# Patient Record
Sex: FEMALE | Race: BLACK OR AFRICAN AMERICAN | NOT HISPANIC OR LATINO | Employment: FULL TIME | URBAN - METROPOLITAN AREA
[De-identification: names, ages, dates, MRNs, and addresses within clinical notes are randomized per-mention and may not be internally consistent; named-entity substitution may affect disease eponyms.]

---

## 2017-02-08 ENCOUNTER — OFFICE VISIT (OUTPATIENT)
Dept: OBSTETRICS AND GYNECOLOGY | Facility: CLINIC | Age: 37
End: 2017-02-08
Payer: COMMERCIAL

## 2017-02-08 VITALS
SYSTOLIC BLOOD PRESSURE: 110 MMHG | HEIGHT: 66 IN | DIASTOLIC BLOOD PRESSURE: 88 MMHG | WEIGHT: 209.88 LBS | BODY MASS INDEX: 33.73 KG/M2

## 2017-02-08 DIAGNOSIS — Z34.90 GRAVIDA 7, CURRENTLY PREGNANT: ICD-10-CM

## 2017-02-08 DIAGNOSIS — Z3A.01 4 WEEKS GESTATION OF PREGNANCY: Primary | ICD-10-CM

## 2017-02-08 DIAGNOSIS — N92.6 MISSED PERIOD: ICD-10-CM

## 2017-02-08 DIAGNOSIS — O09.521 AMA (ADVANCED MATERNAL AGE) MULTIGRAVIDA 35+, FIRST TRIMESTER: ICD-10-CM

## 2017-02-08 DIAGNOSIS — Z34.90 UNPLANNED WANTED PREGNANCY: ICD-10-CM

## 2017-02-08 LAB
B-HCG UR QL: POSITIVE
CTP QC/QA: YES

## 2017-02-08 PROCEDURE — 99999 PR PBB SHADOW E&M-EST. PATIENT-LVL III: CPT | Mod: PBBFAC,,, | Performed by: NURSE PRACTITIONER

## 2017-02-08 PROCEDURE — 99214 OFFICE O/P EST MOD 30 MIN: CPT | Mod: S$GLB,,, | Performed by: NURSE PRACTITIONER

## 2017-02-08 NOTE — PROGRESS NOTES
Chief Complaint: Missed Period    HPI: Vika Donis is a 36 y.o., , reporting absence of menses with  Patient's last menstrual period was 2017. Patient and partner were not attempting pregnancy but are okay with pregnancy. She currently denies any vaginal bleeding, leaking fluids, abnormal vaginal discharge,  or GI complaints. No fetal movement detected at this time. She is currently taking a daily prenatal vitamin and no other changes in medications reported at this time. Pt with previous PTD 2/2 Pre-Eclampsia. Currently with CHTN and was taking HCTZ but not regularly and has stopped taking this week. No chronic medical problems for FOB, denies ACOG recommended genetic screening history for patient or FOB    Infection History:  Denies TB exposure, STD history (+HPV), rash since LMP, h/o HSV for pt or partner  Vaccine History:  Last Flu vaccine 10. Last Tdap UTD Childhood Vaccines were UTD    Past Medical History   Diagnosis Date    Abnormal Pap smear of cervix       HPV+    HPV (human papilloma virus) infection     Hypertension      Past Surgical History   Procedure Laterality Date     section       x2     Social History   Substance Use Topics    Smoking status: Never Smoker    Smokeless tobacco: None    Alcohol use Yes      Comment: once a week      Family History   Problem Relation Age of Onset    Diabetes Paternal Grandmother     Hypertension Mother     Breast cancer Maternal Aunt     Colon cancer Neg Hx     Ovarian cancer Neg Hx      OB History    Para Term  AB SAB TAB Ectopic Multiple Living   7 2  2 4 2 2   2      # Outcome Date GA Lbr Drake/2nd Weight Sex Delivery Anes PTL Lv   7 Current            6  13 36w0d  2.977 kg (6 lb 9 oz) M CS-Unspec   Y      Complications: Preeclampsia   5 TAB         N   4  07 36w0d  2.126 kg (4 lb 11 oz) F CS-Unspec   Y      Complications: Preeclampsia   3 SAB         N   2 SAB         " N   1 TAB 2003        N        Visit Vitals    /88 (BP Location: Left arm)    Ht 5' 6" (1.676 m)    Wt 95.2 kg (209 lb 14.1 oz)    LMP 01/11/2017    BMI 33.88 kg/m2     ROS   Systemic: Not feeling tired (fatigue).  No fever chills   Gastrointestinal: No nausea, vomiting, no abdominal pain.  No diarrhea.  Genitourinary: No dysuria. No Pelvic Pain  Skin: No rash.      Physical Exam:   Vital Signs:° Normal.  General Appearance:° Well developed.  ° Well nourished.  Neurological:° No disorientation was observed.  Psychiatric: Affect: ° Normal.    Assessment/Plan  1. Confirmation of pregnancy--UPT in office positive, with pts LMP patient is approximately  4wks 0 days gestation with ROBERT 10.18.17. Ordered dating Ultrasound and apt with OBMD. Start/Continue daily prenatal vitamin. Precautions given and s/s to report back to the office discussed with patient. First T ACOG education discussed today and handout provided. Zika precautions discussed. Discussed genetics with AMA at time of delivery    2. CHTN--stop HCTZ, take home BP readings, discussed readings to report back to office. Will send message to OBMD about medications and monitoring.     RTC prn as schedule with OBMD     ~25 minutes spent with pt Face to Face with >50% of visit spent on education/counseling  "

## 2017-02-08 NOTE — PATIENT INSTRUCTIONS
Topic  General Pregnancy Information Recommended   (Unless Otherwise Contraindicated Or Restrictions Given To You By Your OB Doctor)      1. Anticipated course of prenatal care       Visits: will be Every 4 wks until 28 weeks, then every 2 weeks until 36 weeks, and then weekly until delivery.    Urine will be collected at each Obstetric visit        2. Nutrition and weight gain     Daily pre-sammy vitamin (recommend taking at night)    Additional 300 calories needed daily   No Sushi, hotdogs, unpasteurized products (milk/cheeses). No large fish such as: shark, gregg mackerel, tile, sword fish    Incorporate 12 ounces of smaller seafood/week and no more than 6oz of albacore tuna    Caffeine: 200 mg/day or 2 cups of caffeine/day    Weight gain recommendations are based off of BMI before pregnancy. Generally patients who with normal weight prior pregnancy it is recommended 25-35 pounds of weight gain during the pregnancy with an estimated weekly gain of 1 pound/wk in 2nd and 3rd Trimester.    3. Toxoplasmosis precautions   If cats are in the home avoid changing litter boxes and if you need to change the litter box recommended you use gloves   4. Sexual Activity   Sexual activity is okay unless you are put on restrictions by your provider. I recommend urinating after intercourse    5. Exercise   Generally pre-pregnancy routine is okay to continue    Drink plenty fluids for hydration    Stop any activity that causes heavy cramping like a period or bright red bleeding and contact your provider   No extreme or contact sports    No exercise on your back for an extended period of time after 20 weeks    6. Hot Tub/Saunas   Avoid hot tubes and saunas    7. Hair Treatments   Because of the lack of scientific studies on the effects of chemical treatments on your hair, we must advise that you do it at your own risk. If you choose to treat your hair, we recommend that you wait until after 12 weeks gestation. At  this time there is no reason to believe that normal hair treatment is associated with onsequences to the baby.    8. Vaccines   Influenza vaccine is recommended by CDC during flu season    Tdap (pertussis or whopping cough) recommended each pregnancy between 27 and 36 weeks    Tdap booster recommended for family and other planned direct caregivers    9. Water   Water is an important nutrient in a good diet. However, it cannot be stressed enough that during pregnancy water is essential. The body has increased circulation through blood vessels, and without a large increase in water, pregnant women will be dehydrated. It plays an important role in decreasing constipation, preventing  contractions, decreasing swelling, and preventing dizziness. We recommend that you drink 8-10 glasses of water per day.    10. Smoking/Alcohol/Illicit Drug Use   No safe Level    Can lead to problems with pregnancy    Growth of the developing fetus     labor (delivery before 37 weeks)     rupture of the membranes (water breaking before 37 weeks)    Premature separation of the placenta (which may cause bleeding)    American College of Obstetricians and Gynecologists endorses abstinence    Can lead to babies with disabilities    11. Environmental or work hazards   Unless otherwise restricted you may continue work throughout the pregnancy    Notify your provider of any work hazards or chemical exposure concerns   12. Travel     Safe to travel up to 35 weeks    Continue to wear a seatbelt and airbags are still recommended    Drink plenty fluids    Blood clots are a concern during pregnancy with long travel. Recommend compression stockings and moving around at least every 2 hours and staying hydrated.    13. Use of medications, vitamins, herbs, OTC drugs     Any medications not on the list provided to you from our clinic or given to you by one of our providers we recommend calling to make sure the  medication is safe for you and baby.    14. Domestic Violence     Please notify office immediately of any concerns or violence so that we can help direct you to assistance needed    [place] [employer]: [phone]    15. Childbirth classes     List of Childbirth classes from [facility] is available    16. Selecting a Pediatrician   Selecting a pediatrician before delivery is recommended   You can interview pediatricians before delivery    17. Fetal Monitoring     A simple test of your babys well-being is a kick count. After 26 weeks, fetal motion of any kind should be monitored. Further discussion at that time   18.  Labor Signs     Water break, leaking fluids from Vagina prior 37 weeks   Regular contractions, Contractions that are more than 5-6/hour, getting stronger and painful with lower back pain, does not go away with rest and fluids    19. Postpartum Family Planning     Multiple options available from short term methods to long term reversible and irreversible methods    Discuss with provider as you get closer to delivery    20. Dental     It is recommended that you get an annual dental cleaning    21. Breastfeeding     Classes offered at [facility] and it is recommended to take a class    22. Lifting  In 2013, the National [place] for Occupational Safety and Health (NIOSH) published clinical guidelines for occupational lifting in uncomplicated pregnancies. The recommended weight limits are based on gestational age, intermittent versus repetitive lifting, time (hours/day) spent lifting, and lifting height from floor and distance in 3 front of body. In this guideline, the maximum permissible weight for a woman less than 20 weeks of gestation performing infrequent lifting is 36 pounds (16 kgs) and the maximum permissible weight at ?20 weeks is 26 pounds (12 kgs). For repetitive lifting ?1 hour/day, the maximum weights in the first and second half of pregnancy are  18 pounds (8 kgs) and 13 pounds (6 kgs), respectively, and for repetitive lifting <1 hour/day, the maximum weights are 30 pounds (14 kgs) and 22 pounds (10 kgs), respectively. Although not based on high quality evidence, these guidelines are a reasonable reference for counseling pregnant women     23. Scheduling and Provider Availability     Your Obstetric Doctor is usually here weekly but not every day. We recommend you make 3-4 advanced appointments at a time to accommodate your personal needs and work/school obligations.    We ask that you come 15 minutes prior your scheduled appointment.    For same day appointments (not routine appointments) there is a Nurse Practitioner or another obstetric provider available. Please let the  aware you are an OB patient requesting a same day appointment.      24. Recommended Phone Tegan     [facility]    [facility]

## 2017-02-08 NOTE — MR AVS SNAPSHOT
[facility]  [address]  [place] [address]  Phone: [phone]  Fax: [phone]                  Vika Donis   2017 9:20 AM   Office Visit    Description:  Female : 1980   Provider:  Mei Knapp NP   Department:  [facility]           Reason for Visit     Absent Menses           Diagnoses this Visit        Comments    Missed period    -  Primary            To Do List           Future Appointments        Provider Department Dept Phone    3/7/2017 3:00 PM [facility], WOMEN'S ULTRASOUND [facility] [phone]    3/7/2017 3:45 PM Asher Gannon MD [facility] [phone]    4/10/2017 9:45 AM Asher Gannon MD [facility] [phone]    2017 9:45 AM Asher Gannon MD [facility] [phone]      Goals (5 Years of Data)     None      [facility] On [facility]     [facility] On Call Nurse [facility] -  Assistance  Registered nurses in the [facility] On Call Center provide clinical advisement, health education, appointment booking, and other advisory services.  Call for this free service at [phone].             Medications           Message regarding Medications     Verify the changes and/or additions to your medication regime listed below are the same as discussed with your clinician today.  If any of these changes or additions are incorrect, please notify your healthcare provider.             Verify that the below list of medications is an accurate representation of the medications you are currently taking.  If none reported, the list may be blank. If incorrect, please contact your healthcare provider. Carry this list with you in case of emergency.           Current Medications     hydrochlorothiazide (MICROZIDE) 12.5 mg capsule Take 12.5 mg by mouth once daily.    L.acidophilus-Bif. animalis (PROBIOTIC) 5 billion cell CpSP Take by mouth.    norethindrone-e.estradiol-iron (GILDESS 24 FE) 1 mg-20 mcg (24)/75  "mg (4) Oral per tablet Take 1 tablet by mouth once daily.           Clinical Reference Information           Your Vitals Were     BP Height Weight Last Period BMI    110/92 5' 6" (1.676 m) 95.2 kg (209 lb 14.1 oz) 01/11/2017 33.88 kg/m2      Blood Pressure          Most Recent Value    BP  (!)  110/92      Allergies as of 2/8/2017     No Known Allergies      Immunizations Administered on Date of Encounter - 2/8/2017     None      Orders Placed During Today's Visit      Normal Orders This Visit    POCT urine pregnancy     Future Labs/Procedures Expected by Expires    US OB/GYN Procedure ([facility]) - Extended List  As directed 2/8/2018 2/8/2017  8:45 AM - Elli Kovacs MA      Component Results     Component Value Flag Ref Range Units Status    POC Preg Test, Ur Positive (A) Negative  Final     Acceptable Yes    Final            [facility] Sign-Up     Activating your [facility] account is as easy as 1-2-3!     1) Visit [email or web address], select Sign Up Now, enter this activation code and your date of birth, then select Next.  [email or web address]  Expires: 3/25/2017  9:17 AM      2) Create a username and password to use when you visit [facility] in the future and select a security question in case you lose your password and select Next.    3) Enter your e-mail address and click Sign Up!    Additional Information  If you have questions, please e-mail [email or web address] or call [phone] to talk to our [facility] staff. Remember, [facility] is NOT to be used for urgent needs. For medical emergencies, dial 911.         Instructions    Topic  General Pregnancy Information Recommended   (Unless Otherwise Contraindicated Or Restrictions Given To You By Your OB Doctor)      1. Anticipated course of prenatal care       Visits: will be Every 4 wks until 28 weeks, then every 2 weeks until 36 weeks, and then weekly until delivery.    Urine will be collected at each Obstetric visit        2. Nutrition and " weight gain     Daily pre- vitamin (recommend taking at night)    Additional 300 calories needed daily   No Sushi, hotdogs, unpasteurized products (milk/cheeses). No large fish such as: shark, gregg mackerel, tile, sword fish    Incorporate 12 ounces of smaller seafood/week and no more than 6oz of albacore tuna    Caffeine: 200 mg/day or 2 cups of caffeine/day    Weight gain recommendations are based off of BMI before pregnancy. Generally patients who with normal weight prior pregnancy it is recommended 25-35 pounds of weight gain during the pregnancy with an estimated weekly gain of 1 pound/wk in 2nd and 3rd Trimester.    3. Toxoplasmosis precautions   If cats are in the home avoid changing litter boxes and if you need to change the litter box recommended you use gloves   4. Sexual Activity   Sexual activity is okay unless you are put on restrictions by your provider. I recommend urinating after intercourse    5. Exercise   Generally pre-pregnancy routine is okay to continue    Drink plenty fluids for hydration    Stop any activity that causes heavy cramping like a period or bright red bleeding and contact your provider   No extreme or contact sports    No exercise on your back for an extended period of time after 20 weeks    6. Hot Tub/Saunas   Avoid hot tubes and saunas    7. Hair Treatments   Because of the lack of scientific studies on the effects of chemical treatments on your hair, we must advise that you do it at your own risk. If you choose to treat your hair, we recommend that you wait until after 12 weeks gestation. At this time there is no reason to believe that normal hair treatment is associated with onsequences to the baby.    8. Vaccines   Influenza vaccine is recommended by CDC during flu season    Tdap (pertussis or whopping cough) recommended each pregnancy between 27 and 36 weeks    Tdap booster recommended for family and other planned direct caregivers    9. Water   Water  is an important nutrient in a good diet. However, it cannot be stressed enough that during pregnancy water is essential. The body has increased circulation through blood vessels, and without a large increase in water, pregnant women will be dehydrated. It plays an important role in decreasing constipation, preventing  contractions, decreasing swelling, and preventing dizziness. We recommend that you drink 8-10 glasses of water per day.    10. Smoking/Alcohol/Illicit Drug Use   No safe Level    Can lead to problems with pregnancy    Growth of the developing fetus     labor (delivery before 37 weeks)     rupture of the membranes (water breaking before 37 weeks)    Premature separation of the placenta (which may cause bleeding)    American College of Obstetricians and Gynecologists endorses abstinence    Can lead to babies with disabilities    11. Environmental or work hazards   Unless otherwise restricted you may continue work throughout the pregnancy    Notify your provider of any work hazards or chemical exposure concerns   12. Travel     Safe to travel up to 35 weeks    Continue to wear a seatbelt and airbags are still recommended    Drink plenty fluids    Blood clots are a concern during pregnancy with long travel. Recommend compression stockings and moving around at least every 2 hours and staying hydrated.    13. Use of medications, vitamins, herbs, OTC drugs     Any medications not on the list provided to you from our clinic or given to you by one of our providers we recommend calling to make sure the medication is safe for you and baby.    14. Domestic Violence     Please notify office immediately of any concerns or violence so that we can help direct you to assistance needed    [place] [employer] Domestic [employer]: [phone]    15. Childbirth classes     List of Childbirth classes from [facility] is available    16. Selecting a Pediatrician   Selecting a  pediatrician before delivery is recommended   You can interview pediatricians before delivery    17. Fetal Monitoring     A simple test of your babys well-being is a kick count. After 26 weeks, fetal motion of any kind should be monitored. Further discussion at that time   18.  Labor Signs     Water break, leaking fluids from Vagina prior 37 weeks   Regular contractions, Contractions that are more than 5-6/hour, getting stronger and painful with lower back pain, does not go away with rest and fluids    19. Postpartum Family Planning     Multiple options available from short term methods to long term reversible and irreversible methods    Discuss with provider as you get closer to delivery    20. Dental     It is recommended that you get an annual dental cleaning    21. Breastfeeding     Classes offered at [facility] and it is recommended to take a class    22. Lifting  In 2013, the National [place] for Occupational Safety and Health (NIOSH) published clinical guidelines for occupational lifting in uncomplicated pregnancies. The recommended weight limits are based on gestational age, intermittent versus repetitive lifting, time (hours/day) spent lifting, and lifting height from floor and distance in 3 front of body. In this guideline, the maximum permissible weight for a woman less than 20 weeks of gestation performing infrequent lifting is 36 pounds (16 kgs) and the maximum permissible weight at ?20 weeks is 26 pounds (12 kgs). For repetitive lifting ?1 hour/day, the maximum weights in the first and second half of pregnancy are 18 pounds (8 kgs) and 13 pounds (6 kgs), respectively, and for repetitive lifting <1 hour/day, the maximum weights are 30 pounds (14 kgs) and 22 pounds (10 kgs), respectively. Although not based on high quality evidence, these guidelines are a reasonable reference for counseling pregnant women     23. Scheduling and Provider Availability     Your Obstetric Doctor is usually  here weekly but not every day. We recommend you make 3-4 advanced appointments at a time to accommodate your personal needs and work/school obligations.    We ask that you come 15 minutes prior your scheduled appointment.    For same day appointments (not routine appointments) there is a Nurse Practitioner or another obstetric provider available. Please let the  aware you are an OB patient requesting a same day appointment.      24. Recommended Phone Tegan     [facility]               Language Assistance Services     ATTENTION: Language assistance services are available, free of charge. Please call [phone].      ATENCIÓN: Si habla español, tiene a bradshaw disposición servicios gratuitos de asistencia lingüística. Llame al [phone].     OSWALDO Ý: N?u b?n nói Ti?ng Vi?t, có các d?ch v? h? tr? ngôn ng? mi?n phí dành cho b?n. G?i s? [phone].         [facility] complies with applicable Federal civil rights laws and does not discriminate on the basis of race, color, national origin, age, disability, or sex.

## 2017-03-07 ENCOUNTER — PROCEDURE VISIT (OUTPATIENT)
Dept: OBSTETRICS AND GYNECOLOGY | Facility: CLINIC | Age: 37
End: 2017-03-07
Payer: COMMERCIAL

## 2017-03-07 ENCOUNTER — INITIAL PRENATAL (OUTPATIENT)
Dept: OBSTETRICS AND GYNECOLOGY | Facility: CLINIC | Age: 37
End: 2017-03-07
Attending: OBSTETRICS & GYNECOLOGY
Payer: COMMERCIAL

## 2017-03-07 VITALS
WEIGHT: 213.31 LBS | BODY MASS INDEX: 34.43 KG/M2 | DIASTOLIC BLOOD PRESSURE: 76 MMHG | SYSTOLIC BLOOD PRESSURE: 122 MMHG

## 2017-03-07 DIAGNOSIS — Z34.91 ENCOUNTER FOR SUPERVISION OF NORMAL PREGNANCY IN FIRST TRIMESTER, UNSPECIFIED GRAVIDITY: ICD-10-CM

## 2017-03-07 DIAGNOSIS — N92.6 MISSED PERIOD: ICD-10-CM

## 2017-03-07 DIAGNOSIS — Z34.81 SUPERVISION OF NORMAL INTRAUTERINE PREGNANCY IN MULTIGRAVIDA, FIRST TRIMESTER: Primary | ICD-10-CM

## 2017-03-07 PROCEDURE — 99999 PR PBB SHADOW E&M-EST. PATIENT-LVL II: CPT | Mod: PBBFAC,,, | Performed by: OBSTETRICS & GYNECOLOGY

## 2017-03-07 PROCEDURE — 76801 OB US < 14 WKS SINGLE FETUS: CPT | Mod: 26,S$GLB,, | Performed by: OBSTETRICS & GYNECOLOGY

## 2017-03-07 PROCEDURE — 0500F INITIAL PRENATAL CARE VISIT: CPT | Mod: S$GLB,,, | Performed by: OBSTETRICS & GYNECOLOGY

## 2017-03-07 PROCEDURE — 87086 URINE CULTURE/COLONY COUNT: CPT

## 2017-03-07 RX ORDER — HYDROCHLOROTHIAZIDE 12.5 MG/1
12.5 TABLET ORAL DAILY
Refills: 0 | COMMUNITY
Start: 2017-02-02 | End: 2017-03-07

## 2017-03-09 LAB — BACTERIA UR CULT: NORMAL

## 2017-03-13 NOTE — PROGRESS NOTES
46-year-old  presents with a positive pregnancy test here for ultrasound and dating today.  Ultrasound consistent with LMP providing a due date of 10/18/17.  Patient without complaints.  Feeling and doing well.  Some waves of nausea but has no emesis.  We discussed B6 and Unisom.

## 2017-03-14 ENCOUNTER — TELEPHONE (OUTPATIENT)
Dept: OBSTETRICS AND GYNECOLOGY | Facility: CLINIC | Age: 37
End: 2017-03-14

## 2017-03-14 NOTE — TELEPHONE ENCOUNTER
[facility] called to confirm date of pts next appt because they could not reach pt. I called pt and confirmed with her that she was ok with us giving that information. She signed a release last week so that they could become her secondary insurance provider. Information given to [facility].

## 2017-04-10 ENCOUNTER — ROUTINE PRENATAL (OUTPATIENT)
Dept: OBSTETRICS AND GYNECOLOGY | Facility: CLINIC | Age: 37
End: 2017-04-10
Payer: COMMERCIAL

## 2017-04-10 ENCOUNTER — LAB VISIT (OUTPATIENT)
Dept: LAB | Facility: HOSPITAL | Age: 37
End: 2017-04-10
Attending: OBSTETRICS & GYNECOLOGY
Payer: COMMERCIAL

## 2017-04-10 VITALS
BODY MASS INDEX: 34.11 KG/M2 | DIASTOLIC BLOOD PRESSURE: 78 MMHG | WEIGHT: 211.31 LBS | SYSTOLIC BLOOD PRESSURE: 122 MMHG

## 2017-04-10 DIAGNOSIS — Z34.81 SUPERVISION OF NORMAL INTRAUTERINE PREGNANCY IN MULTIGRAVIDA, FIRST TRIMESTER: ICD-10-CM

## 2017-04-10 DIAGNOSIS — Z3A.12 12 WEEKS GESTATION OF PREGNANCY: Primary | ICD-10-CM

## 2017-04-10 LAB
ABO + RH BLD: NORMAL
BASOPHILS # BLD AUTO: 0.01 K/UL
BASOPHILS NFR BLD: 0.3 %
BLD GP AB SCN CELLS X3 SERPL QL: NORMAL
DIFFERENTIAL METHOD: ABNORMAL
EOSINOPHIL # BLD AUTO: 0 K/UL
EOSINOPHIL NFR BLD: 0.5 %
ERYTHROCYTE [DISTWIDTH] IN BLOOD BY AUTOMATED COUNT: 12.2 %
GLUCOSE SERPL-MCNC: 91 MG/DL
HCT VFR BLD AUTO: 35.4 %
HGB BLD-MCNC: 12.2 G/DL
LYMPHOCYTES # BLD AUTO: 1 K/UL
LYMPHOCYTES NFR BLD: 25.5 %
MCH RBC QN AUTO: 29.5 PG
MCHC RBC AUTO-ENTMCNC: 34.5 %
MCV RBC AUTO: 86 FL
MONOCYTES # BLD AUTO: 0.3 K/UL
MONOCYTES NFR BLD: 7.1 %
NEUTROPHILS # BLD AUTO: 2.6 K/UL
NEUTROPHILS NFR BLD: 66.3 %
PLATELET # BLD AUTO: 239 K/UL
PMV BLD AUTO: 10.8 FL
RBC # BLD AUTO: 4.13 M/UL
TSH SERPL DL<=0.005 MIU/L-ACNC: 1.16 UIU/ML
WBC # BLD AUTO: 3.96 K/UL

## 2017-04-10 PROCEDURE — 99999 PR PBB SHADOW E&M-EST. PATIENT-LVL II: CPT | Mod: PBBFAC,,, | Performed by: OBSTETRICS & GYNECOLOGY

## 2017-04-10 PROCEDURE — 85025 COMPLETE CBC W/AUTO DIFF WBC: CPT

## 2017-04-10 PROCEDURE — 86762 RUBELLA ANTIBODY: CPT

## 2017-04-10 PROCEDURE — 0502F SUBSEQUENT PRENATAL CARE: CPT | Mod: S$GLB,,, | Performed by: OBSTETRICS & GYNECOLOGY

## 2017-04-10 PROCEDURE — 86592 SYPHILIS TEST NON-TREP QUAL: CPT

## 2017-04-10 PROCEDURE — 86850 RBC ANTIBODY SCREEN: CPT

## 2017-04-10 PROCEDURE — 86703 HIV-1/HIV-2 1 RESULT ANTBDY: CPT

## 2017-04-10 PROCEDURE — 86900 BLOOD TYPING SEROLOGIC ABO: CPT

## 2017-04-10 PROCEDURE — 84443 ASSAY THYROID STIM HORMONE: CPT

## 2017-04-10 PROCEDURE — 87340 HEPATITIS B SURFACE AG IA: CPT

## 2017-04-10 PROCEDURE — 82947 ASSAY GLUCOSE BLOOD QUANT: CPT

## 2017-04-10 NOTE — MR AVS SNAPSHOT
[facility]  [address]  [place] [address]  Phone: [phone]  Fax: [phone]                  Vika Donis   4/10/2017 9:45 AM   Routine Prenatal    Description:  Female : 1980   Provider:  Asher Gannon MD   Department:  [facility]           Reason for Visit     Routine Prenatal Visit           Diagnoses this Visit        Comments    12 weeks gestation of pregnancy    -  Primary            To Do List           Future Appointments        Provider Department Dept Phone    2017 9:45 AM Asher Gannon MD [facility] [phone]    2017 10:15 AM ADDITIONS, SPECIAL LWSC [facility] [phone]    2017 9:40 AM Debbie Goodson NP [facility] [phone]    7/3/2017 9:15 AM Asher Gannon MD [facility] [phone]      Goals (5 Years of Data)     None      Follow-Up and Disposition     Return in about 4 weeks (around 2017).      [facility]     [facility] On Call Nurse Care Line -  Assistance  Unless otherwise directed by your provider, please contact [facility], our nurse care line that is available for  assistance.     Registered nurses in the [facility] On Call Center provide: appointment scheduling, clinical advisement, health education, and other advisory services.  Call: [phone] (toll free)               Medications           Message regarding Medications     Verify the changes and/or additions to your medication regime listed below are the same as discussed with your clinician today.  If any of these changes or additions are incorrect, please notify your healthcare provider.             Verify that the below list of medications is an accurate representation of the medications you are currently taking.  If none reported, the list may be blank. If incorrect, please contact your healthcare provider. Carry this list with you in case of emergency.           Current  Medications     multivitamin with minerals tablet Take 1 tablet by mouth once daily.           Clinical Reference Information           Prenatal Vitals     Enc. Date GA Prenatal Vitals Prenatal Pulse Pain Level Urine Albumin/Glucose Edema Presentation Dilation/Effacement/Station    4/10/17 12w5d 122/78 / 95.8 kg (211 lb 5 oz) 12 cm / 155 / Absent  0 Negative / Negative None / None / None / No      3/7/17 7w6d 122/76 / 96.7 kg (213 lb 4.7 oz) 8 cm / present / Absent  0 Negative / Negative None / None / None / No        Your Vitals Were     BP Weight Last Period BMI       122/78 95.8 kg (211 lb 5 oz) 01/11/2017 34.11 kg/m2       Allergies as of 4/10/2017     No Known Allergies      Immunizations Administered on Date of Encounter - 4/10/2017     None      Language Assistance Services     ATTENTION: Language assistance services are available, free of charge. Please call [phone].      ATENCIÓN: Si habla español, tiene a bradshaw disposición servicios gratuitos de asistencia lingüística. Llame al [phone].     [facility] Ý: N?u b?n nói Ti?ng Vi?t, có các d?ch v? h? tr? ngôn ng? mi?n phí eneidah cho b?n. G?i s? [phone].         [facility] complies with applicable Federal civil rights laws and does not discriminate on the basis of race, color, national origin, age, disability, or sex.

## 2017-04-10 NOTE — LETTER
April 10, 2017    Vika Donis  [address]  [place] [address]             [facility] [address] [facility] [address]  Phone: [phone]  Fax: [phone]   To Whom it May Concern:    Please cancel or postpone Vika Donis membership as she is currently 12+wks pregnant. If you have any questions please call us.    Thanks,    Dr.Robin Gannon

## 2017-04-10 NOTE — PROGRESS NOTES
Patient without complaints except for mild nausea.  Has been doing Unisom at night but has not taken B6 yet.  We discussed I cleavages but patient declined as it's not that bad.  Will put in order for MFM ultrasound at 20 weeks.  Will draw prenatal labs today.

## 2017-04-11 LAB
HBV SURFACE AG SERPL QL IA: NEGATIVE
HIV 1+2 AB+HIV1 P24 AG SERPL QL IA: NEGATIVE
RPR SER QL: NORMAL
RUBV IGG SER-ACNC: 44 IU/ML
RUBV IGG SER-IMP: REACTIVE

## 2017-05-08 ENCOUNTER — ROUTINE PRENATAL (OUTPATIENT)
Dept: OBSTETRICS AND GYNECOLOGY | Facility: CLINIC | Age: 37
End: 2017-05-08
Payer: COMMERCIAL

## 2017-05-08 VITALS
WEIGHT: 214.75 LBS | DIASTOLIC BLOOD PRESSURE: 78 MMHG | BODY MASS INDEX: 34.66 KG/M2 | SYSTOLIC BLOOD PRESSURE: 122 MMHG

## 2017-05-08 DIAGNOSIS — Z3A.16 16 WEEKS GESTATION OF PREGNANCY: Primary | ICD-10-CM

## 2017-05-08 PROCEDURE — 0502F SUBSEQUENT PRENATAL CARE: CPT | Mod: S$GLB,,, | Performed by: OBSTETRICS & GYNECOLOGY

## 2017-05-08 PROCEDURE — 99999 PR PBB SHADOW E&M-EST. PATIENT-LVL II: CPT | Mod: PBBFAC,,, | Performed by: OBSTETRICS & GYNECOLOGY

## 2017-05-08 NOTE — PROGRESS NOTES
Pt without c/o No nausea now Desires mat 21. She will call for insurance coverage.  MFM u/s at 20 weeks

## 2017-05-08 NOTE — MR AVS SNAPSHOT
[facility]  [address]  [place] [address]  Phone: [phone]  Fax: [phone]                  Vika Donis   2017 9:45 AM   Routine Prenatal    Description:  Female : 1980   Provider:  Asher Gannon MD   Department:  [facility]           Reason for Visit     Routine Prenatal Visit           Diagnoses this Visit        Comments    16 weeks gestation of pregnancy    -  Primary            To Do List           Future Appointments        Provider Department Dept Phone    2017 9:40 AM Debbie Goodson NP [facility] [phone]    7/3/2017 9:15 AM Asher Gannon MD [facility] [phone]      Goals (5 Years of Data)     None      Follow-Up and Disposition     Return in about 4 weeks (around 2017).    Follow-up and Disposition History      [facility]     [facility] On Call Nurse Care Line -  Assistance  Unless otherwise directed by your provider, please contact [facility], our nurse care line that is available for  assistance.     Registered nurses in the [facility] On Call Center provide: appointment scheduling, clinical advisement, health education, and other advisory services.  Call: [phone] (toll free)               Medications           Message regarding Medications     Verify the changes and/or additions to your medication regime listed below are the same as discussed with your clinician today.  If any of these changes or additions are incorrect, please notify your healthcare provider.             Verify that the below list of medications is an accurate representation of the medications you are currently taking.  If none reported, the list may be blank. If incorrect, please contact your healthcare provider. Carry this list with you in case of emergency.           Current Medications     multivitamin with minerals tablet Take 1 tablet by mouth once daily.           Clinical Reference  Information           Prenatal Vitals     Enc. Date GA Prenatal Vitals Prenatal Pulse Pain Level Urine Albumin/Glucose Edema Presentation Dilation/Effacement/Station    5/8/17 16w5d 122/78 / 97.4 kg (214 lb 11.7 oz) 16 cm / 144 / Present  0 Negative / Negative None / None / None / No      4/10/17 12w5d 122/78 / 95.8 kg (211 lb 5 oz) 12 cm / 155 / Absent  0 Negative / Negative None / None / None / No      3/7/17 7w6d 122/76 / 96.7 kg (213 lb 4.7 oz) 8 cm / present / Absent  0 Negative / Negative None / None / None / No        Your Vitals Were     BP Weight Last Period BMI       122/78 97.4 kg (214 lb 11.7 oz) 01/11/2017 34.66 kg/m2       Allergies as of 5/8/2017     No Known Allergies      Immunizations Administered on Date of Encounter - 5/8/2017     None      Orders Placed During Today's Visit     Future Labs/Procedures Expected by Expires    [facility] Procedure (Viewpoint)  As directed 5/8/2018      Language Assistance Services     ATTENTION: Language assistance services are available, free of charge. Please call [phone].      ATENCIÓN: Si habla español, tiene a bradshaw disposición servicios gratuitos de asistencia lingüística. Llame al [phone].     [facility] Ý: N?u b?n nói Ti?ng Vi?t, có các d?ch v? h? tr? ngôn ng? mi?n phí dành cho b?n. G?i s? [phone].         [facility] complies with applicable Federal civil rights laws and does not discriminate on the basis of race, color, national origin, age, disability, or sex.

## 2017-06-05 ENCOUNTER — OFFICE VISIT (OUTPATIENT)
Dept: MATERNAL FETAL MEDICINE | Facility: CLINIC | Age: 37
End: 2017-06-05
Attending: OBSTETRICS & GYNECOLOGY
Payer: COMMERCIAL

## 2017-06-05 ENCOUNTER — ROUTINE PRENATAL (OUTPATIENT)
Dept: OBSTETRICS AND GYNECOLOGY | Facility: CLINIC | Age: 37
End: 2017-06-05
Payer: COMMERCIAL

## 2017-06-05 VITALS
BODY MASS INDEX: 35.28 KG/M2 | SYSTOLIC BLOOD PRESSURE: 116 MMHG | DIASTOLIC BLOOD PRESSURE: 72 MMHG | WEIGHT: 218.56 LBS

## 2017-06-05 DIAGNOSIS — Z3A.16 16 WEEKS GESTATION OF PREGNANCY: ICD-10-CM

## 2017-06-05 DIAGNOSIS — Z34.82 ENCOUNTER FOR SUPERVISION OF OTHER NORMAL PREGNANCY, SECOND TRIMESTER: ICD-10-CM

## 2017-06-05 DIAGNOSIS — O09.529 AMA (ADVANCED MATERNAL AGE) MULTIGRAVIDA 35+, UNSPECIFIED TRIMESTER: Primary | ICD-10-CM

## 2017-06-05 DIAGNOSIS — Z36.89 ENCOUNTER FOR FETAL ANATOMIC SURVEY: ICD-10-CM

## 2017-06-05 DIAGNOSIS — Z36.89 ENCOUNTER FOR ULTRASOUND TO CHECK FETAL GROWTH: ICD-10-CM

## 2017-06-05 DIAGNOSIS — Z3A.20 20 WEEKS GESTATION OF PREGNANCY: Primary | ICD-10-CM

## 2017-06-05 PROCEDURE — 76811 OB US DETAILED SNGL FETUS: CPT | Mod: S$GLB,,, | Performed by: OBSTETRICS & GYNECOLOGY

## 2017-06-05 PROCEDURE — 99499 UNLISTED E&M SERVICE: CPT | Mod: S$GLB,,, | Performed by: OBSTETRICS & GYNECOLOGY

## 2017-06-05 PROCEDURE — 0502F SUBSEQUENT PRENATAL CARE: CPT | Mod: S$GLB,,, | Performed by: NURSE PRACTITIONER

## 2017-06-05 PROCEDURE — 99999 PR PBB SHADOW E&M-EST. PATIENT-LVL II: CPT | Mod: PBBFAC,,, | Performed by: NURSE PRACTITIONER

## 2017-06-05 NOTE — PROGRESS NOTES
Doing well today.  Reports some low back pain during latter half of day when working, but thinks it may be r/t her steel toe boots she has to wear to work.  Note given to allow pt to wear soft profile shoes prn.  Labor/bleeding prec given.  Has MFM U/S today.

## 2017-06-05 NOTE — LETTER
June 5, 2017      Asher Gannon MD  [address]  [address]  [facility] [address]           [facility]  [address]  [facility] [address]  Phone: [phone]          Patient: Vika Donis   MR Number: [id number]   YOB: 1980   Date of Visit: 6/5/2017       Dear Dr. Asher Gannon:    Thank you for referring Vika Donis to me for evaluation. Attached you will find relevant portions of my assessment and plan of care.    If you have questions, please do not hesitate to call me. I look forward to following Vika Donis along with you.    Sincerely,    Jennifer Lozano MD    Enclosure  CC:  No Recipients    If you would like to receive this communication electronically, please contact [email or web address] or [phone] to request more information on [facility] access.    For providers and/or their staff who would like to refer a patient to [facility], please contact us through our one-stop-shop provider referral line, [facility] , at [phone].    If you feel you have received this communication in error or would no longer like to receive these types of communications, please e-mail [email or web address]

## 2017-06-05 NOTE — PROGRESS NOTES
Indication  ========    Fetal anatomy survey, AMA.    History  ======    General History  Other: Dr. SAMRA MCCULLOUGH  Previous Outcomes   3  Para 2  Risk Factors  History risk factors: AMA    Pregnancy History  ==============    Maternal Lab Tests  Genetic screening declined.      Method  ======    Transabdominal ultrasound examination. View: Good view.    Pregnancy  =========    New pregnancy. Number of fetuses: 1.    Dating  ======    Cycle: regular cycle  Ultrasound examination on: 2017  GA by U/S based upon: AC, BPD, Femur, HC  GA by U/S 21 w + 3 d  ROBERT by U/S: 10/13/2017  Assigned: Dating performed on 2017, based on the LMP  Assigned GA 20 w + 5 d  Assigned ROBERT: 10/18/2017    General Evaluation  ==============    Cardiac activity: present.  bpm.  Fetal movements: visualized.  Presentation: cephalic.  Placenta:  Placental site: anterior, anterior, right, anterior, fundal.  Umbilical cord: 3 vessel cord, normal.  Amniotic fluid: normal amount.    Fetal Biometry  ============    Fetal Biometry  BPD 47.6 mm 20w 3d Hadlock  OFD 65.4 mm 22w 1d Madeline  .8 mm 20w 5d Hadlock  .2 mm 22w 0d Hadlock  Femur 39.0 mm 22w 4d Hadlock  Cerebellum tr 20.9 mm 20w 4d Steven  CM 4.8 mm  Nuchal fold 4.35 mm  Humerus 36.6 mm 22w 5d Madeline   g 56% Mj  Calculated by: Hadlock (BPD-HC-AC-FL)  EFW (lb) 1 lb  EFW (oz) 1 oz  Cephalic index 0.73  HC / AC 1.08  FL / BPD 0.82  FL / AC 0.23   bpm  Head / Face / Neck   5.7 mm  Nasal bone 5.9 mm    Fetal Anatomy  ============    Cranium: normal  Lateral ventricles: normal  Choroid plexus: normal  Midline falx: normal  Cavum septi pellucidi: normal  Cerebellum: normal  Cisterna magna: normal  Head shape: normal  Rt lateral ventricle: normal  Lt lateral ventricle: normal  Rt choroid plexus: normal  Lt choroid plexus: normal  Parenchyma: normal  Third ventricle: normal  Posterior fossa: normal  Cerebellar  lobes: normal  Vermis: normal  Neck: appears normal  Nuchal fold: normal  Lips: normal  Profile: normal  Nose: normal  Maxilla: normal  Mandible: normal  4-chamber view: normal  RVOT: normal  LVOT: normal  Heart / Thorax: Septal views: normal  Situs: normal  Aortic arch: normal  Ductal arch: normal  SVC: normal  IVC: normal  3-vessel view: normal  3-vessel-trachea view: normal  Cardiac position: normal  Cardiac axis: normal  Cardiac size: normal  Cardiac rhythm: normal  Rt lung: normal  Lt lung: normal  Diaphragm: normal  Cord insertion: normal  Stomach: normal  Kidneys: normal  Bladder: normal  Genitals: normal  Abdom. wall: appears normal  Abdom. cavity: normal  Rt kidney: normal  Lt kidney: normal  Liver: normal  Bowel: normal  Cervical spine: normal  Thoracic spine: normal  Lumbar spine: normal  Sacral spine: normal  Arms: both visualized  Legs: both visualized  Rt arm: normal  Lt arm: normal  Rt hand: visualized  Rt hand: closed  Lt hand: visualized  Lt hand: open  Rt leg: normal  Lt leg: normal  Rt foot: normal  Lt foot: normal  Position of hands: normal  Position of feet: normal  Gender: male  Wants to know gender: yes    Maternal Structures  ===============    Uterus / Cervix  Cervical length 44.1 mm  Ovaries / Tubes / Adnexa  Rt ovary: Visualized  Lt ovary: Visualized  Other: Uterus and adnexa normal    Impression  =========    New live intrauterine pregnancy.  Biometry agrees with the clinical dating.  Normal amniotic fluid volume by qualitative assessment.  Normal fetal anatomy with no obvious abnormalities.  Placenta is anterior, right and fundal without previa.  Cervical length is normal by transabdominal assessment.    Recommendation  ==============    Follow up ultrasound at 32 weeks for growth and fluid assessment due to AMA.

## 2017-06-06 ENCOUNTER — TELEPHONE (OUTPATIENT)
Dept: OBSTETRICS AND GYNECOLOGY | Facility: CLINIC | Age: 37
End: 2017-06-06

## 2017-06-06 NOTE — TELEPHONE ENCOUNTER
Dr Gannon pt calling, Ob pt and she is in the  she needs a note stating her due date and that she is pregnant. Pt said you can send it through the portal but please call her to let her know this is done. Pt # [phone]

## 2017-07-03 ENCOUNTER — ROUTINE PRENATAL (OUTPATIENT)
Dept: OBSTETRICS AND GYNECOLOGY | Facility: CLINIC | Age: 37
End: 2017-07-03
Payer: COMMERCIAL

## 2017-07-03 VITALS
DIASTOLIC BLOOD PRESSURE: 84 MMHG | BODY MASS INDEX: 36.01 KG/M2 | WEIGHT: 223.13 LBS | SYSTOLIC BLOOD PRESSURE: 126 MMHG

## 2017-07-03 DIAGNOSIS — Z34.82 ENCOUNTER FOR SUPERVISION OF OTHER NORMAL PREGNANCY IN SECOND TRIMESTER: Primary | ICD-10-CM

## 2017-07-03 DIAGNOSIS — Z11.3 SCREENING EXAMINATION FOR STD (SEXUALLY TRANSMITTED DISEASE): ICD-10-CM

## 2017-07-03 LAB
C TRACH DNA SPEC QL NAA+PROBE: NOT DETECTED
N GONORRHOEA DNA SPEC QL NAA+PROBE: NOT DETECTED

## 2017-07-03 PROCEDURE — 99999 PR PBB SHADOW E&M-EST. PATIENT-LVL I: CPT | Mod: PBBFAC,,, | Performed by: OBSTETRICS & GYNECOLOGY

## 2017-07-03 PROCEDURE — 87591 N.GONORRHOEAE DNA AMP PROB: CPT

## 2017-07-03 PROCEDURE — 0502F SUBSEQUENT PRENATAL CARE: CPT | Mod: S$GLB,,, | Performed by: OBSTETRICS & GYNECOLOGY

## 2017-07-24 ENCOUNTER — LAB VISIT (OUTPATIENT)
Dept: LAB | Facility: HOSPITAL | Age: 37
End: 2017-07-24
Attending: OBSTETRICS & GYNECOLOGY
Payer: COMMERCIAL

## 2017-07-24 ENCOUNTER — ROUTINE PRENATAL (OUTPATIENT)
Dept: OBSTETRICS AND GYNECOLOGY | Facility: CLINIC | Age: 37
End: 2017-07-24
Payer: COMMERCIAL

## 2017-07-24 VITALS
SYSTOLIC BLOOD PRESSURE: 122 MMHG | DIASTOLIC BLOOD PRESSURE: 80 MMHG | BODY MASS INDEX: 35.97 KG/M2 | WEIGHT: 222.88 LBS

## 2017-07-24 DIAGNOSIS — Z3A.27 27 WEEKS GESTATION OF PREGNANCY: Primary | ICD-10-CM

## 2017-07-24 DIAGNOSIS — Z34.82 ENCOUNTER FOR SUPERVISION OF OTHER NORMAL PREGNANCY IN SECOND TRIMESTER: ICD-10-CM

## 2017-07-24 LAB
BASOPHILS # BLD AUTO: 0.01 K/UL
BASOPHILS NFR BLD: 0.2 %
DIFFERENTIAL METHOD: ABNORMAL
EOSINOPHIL # BLD AUTO: 0.1 K/UL
EOSINOPHIL NFR BLD: 1 %
ERYTHROCYTE [DISTWIDTH] IN BLOOD BY AUTOMATED COUNT: 13 %
GLUCOSE SERPL-MCNC: 146 MG/DL
HCT VFR BLD AUTO: 33.6 %
HGB BLD-MCNC: 11.2 G/DL
LYMPHOCYTES # BLD AUTO: 1.1 K/UL
LYMPHOCYTES NFR BLD: 21.3 %
MCH RBC QN AUTO: 29.2 PG
MCHC RBC AUTO-ENTMCNC: 33.3 G/DL
MCV RBC AUTO: 88 FL
MONOCYTES # BLD AUTO: 0.3 K/UL
MONOCYTES NFR BLD: 6.5 %
NEUTROPHILS # BLD AUTO: 3.6 K/UL
NEUTROPHILS NFR BLD: 70.6 %
PLATELET # BLD AUTO: 200 K/UL
PMV BLD AUTO: 11.5 FL
RBC # BLD AUTO: 3.83 M/UL
WBC # BLD AUTO: 5.06 K/UL

## 2017-07-24 PROCEDURE — 82950 GLUCOSE TEST: CPT

## 2017-07-24 PROCEDURE — 0502F SUBSEQUENT PRENATAL CARE: CPT | Mod: S$GLB,,, | Performed by: NURSE PRACTITIONER

## 2017-07-24 PROCEDURE — 85025 COMPLETE CBC W/AUTO DIFF WBC: CPT

## 2017-07-24 PROCEDURE — 99999 PR PBB SHADOW E&M-EST. PATIENT-LVL II: CPT | Mod: PBBFAC,,, | Performed by: NURSE PRACTITIONER

## 2017-07-25 ENCOUNTER — TELEPHONE (OUTPATIENT)
Dept: OBSTETRICS AND GYNECOLOGY | Facility: CLINIC | Age: 37
End: 2017-07-25

## 2017-07-25 DIAGNOSIS — R73.09 ELEVATED GLUCOSE LEVEL: Primary | ICD-10-CM

## 2017-07-25 NOTE — TELEPHONE ENCOUNTER
Dr. Gannon patient calling- wants a 3 hour glucose test,please call patient back to further discuss

## 2017-07-25 NOTE — PROGRESS NOTES
please call and say    1hr GGT was elevated and needs 3hr GTT. 1hrs is normal less than 140 and hers was 146  1hr GGT is a screen which means she needs further testing  Please sched 3 hr gtt this week  ALso you are slightly anemic due to pregnancy.  This is very common at the end of pregnancy.   I recommend that you start an over-the-counter iron once a day in addition to prenatal vitamin.  Hopefully with the extra iron, you'll have more energy and will be less tired

## 2017-07-26 ENCOUNTER — LAB VISIT (OUTPATIENT)
Dept: LAB | Facility: HOSPITAL | Age: 37
End: 2017-07-26
Attending: OBSTETRICS & GYNECOLOGY
Payer: COMMERCIAL

## 2017-07-26 DIAGNOSIS — R73.09 ELEVATED GLUCOSE LEVEL: ICD-10-CM

## 2017-07-26 LAB
GLUCOSE SERPL-MCNC: 169 MG/DL
GLUCOSE SERPL-MCNC: 172 MG/DL
GLUCOSE SERPL-MCNC: 85 MG/DL
GLUCOSE SERPL-MCNC: 90 MG/DL

## 2017-07-26 PROCEDURE — 82951 GLUCOSE TOLERANCE TEST (GTT): CPT

## 2017-07-31 ENCOUNTER — PATIENT MESSAGE (OUTPATIENT)
Dept: OBSTETRICS AND GYNECOLOGY | Facility: CLINIC | Age: 37
End: 2017-07-31

## 2017-07-31 NOTE — PROGRESS NOTES
Please call and tell patient below   Good news!  Only 1 of the 4 values are abnormal or elevated.  This means that you do not have gestational diabetes.  However I do want to to watch her intake of sweets and carbohydrates.  We can discuss this further at her next visit.

## 2017-08-07 ENCOUNTER — LAB VISIT (OUTPATIENT)
Dept: LAB | Facility: HOSPITAL | Age: 37
End: 2017-08-07
Attending: OBSTETRICS & GYNECOLOGY
Payer: COMMERCIAL

## 2017-08-07 ENCOUNTER — ROUTINE PRENATAL (OUTPATIENT)
Dept: OBSTETRICS AND GYNECOLOGY | Facility: CLINIC | Age: 37
End: 2017-08-07
Payer: COMMERCIAL

## 2017-08-07 VITALS
SYSTOLIC BLOOD PRESSURE: 140 MMHG | DIASTOLIC BLOOD PRESSURE: 88 MMHG | WEIGHT: 224.88 LBS | BODY MASS INDEX: 36.29 KG/M2

## 2017-08-07 DIAGNOSIS — O16.3 HYPERTENSION AFFECTING PREGNANCY, THIRD TRIMESTER: ICD-10-CM

## 2017-08-07 DIAGNOSIS — O16.3 HYPERTENSION AFFECTING PREGNANCY, THIRD TRIMESTER: Primary | ICD-10-CM

## 2017-08-07 LAB
ALBUMIN SERPL BCP-MCNC: 2.9 G/DL
ALP SERPL-CCNC: 71 U/L
ALT SERPL W/O P-5'-P-CCNC: 16 U/L
ANION GAP SERPL CALC-SCNC: 12 MMOL/L
AST SERPL-CCNC: 22 U/L
BASOPHILS # BLD AUTO: 0.01 K/UL
BASOPHILS NFR BLD: 0.2 %
BILIRUB SERPL-MCNC: 0.2 MG/DL
BUN SERPL-MCNC: 6 MG/DL
CALCIUM SERPL-MCNC: 9.5 MG/DL
CHLORIDE SERPL-SCNC: 106 MMOL/L
CO2 SERPL-SCNC: 18 MMOL/L
CREAT SERPL-MCNC: 0.7 MG/DL
DIFFERENTIAL METHOD: ABNORMAL
EOSINOPHIL # BLD AUTO: 0.1 K/UL
EOSINOPHIL NFR BLD: 0.8 %
ERYTHROCYTE [DISTWIDTH] IN BLOOD BY AUTOMATED COUNT: 13.3 %
EST. GFR  (AFRICAN AMERICAN): >60 ML/MIN/1.73 M^2
EST. GFR  (NON AFRICAN AMERICAN): >60 ML/MIN/1.73 M^2
GLUCOSE SERPL-MCNC: 93 MG/DL
HCT VFR BLD AUTO: 33.5 %
HGB BLD-MCNC: 11.2 G/DL
LYMPHOCYTES # BLD AUTO: 1.6 K/UL
LYMPHOCYTES NFR BLD: 25 %
MCH RBC QN AUTO: 29.4 PG
MCHC RBC AUTO-ENTMCNC: 33.4 G/DL
MCV RBC AUTO: 88 FL
MONOCYTES # BLD AUTO: 0.6 K/UL
MONOCYTES NFR BLD: 8.8 %
NEUTROPHILS # BLD AUTO: 4 K/UL
NEUTROPHILS NFR BLD: 64.7 %
PLATELET # BLD AUTO: 191 K/UL
PMV BLD AUTO: 10.9 FL
POTASSIUM SERPL-SCNC: 3.6 MMOL/L
PROT SERPL-MCNC: 6.6 G/DL
RBC # BLD AUTO: 3.81 M/UL
SODIUM SERPL-SCNC: 136 MMOL/L
URATE SERPL-MCNC: 3.3 MG/DL
WBC # BLD AUTO: 6.24 K/UL

## 2017-08-07 PROCEDURE — 84550 ASSAY OF BLOOD/URIC ACID: CPT

## 2017-08-07 PROCEDURE — 85025 COMPLETE CBC W/AUTO DIFF WBC: CPT

## 2017-08-07 PROCEDURE — 80053 COMPREHEN METABOLIC PANEL: CPT

## 2017-08-07 PROCEDURE — 0502F SUBSEQUENT PRENATAL CARE: CPT | Mod: S$GLB,,, | Performed by: OBSTETRICS & GYNECOLOGY

## 2017-08-07 PROCEDURE — 99999 PR PBB SHADOW E&M-EST. PATIENT-LVL II: CPT | Mod: PBBFAC,,, | Performed by: OBSTETRICS & GYNECOLOGY

## 2017-08-07 RX ORDER — NIFEDIPINE 30 MG/1
30 TABLET, EXTENDED RELEASE ORAL DAILY
Qty: 30 TABLET | Refills: 11 | Status: SHIPPED | OUTPATIENT
Start: 2017-08-07 | End: 2017-10-18

## 2017-08-07 NOTE — PROGRESS NOTES
Patient without complaints.  Felt a little woozy earlier this afternoon but feels fine now.  No headaches no vision changes no abdominal pain.  Patient with a history of chronic hypertension was on hydrochlorothiazide but we stopped it first trimester as her blood pressures looked good and this is not compatible with pregnancy.  Thus far she's been on nothing for blood pressure.  Today blood pressure 140/88 with repeat 144/88.  No pre-E symptoms.  We'll check pre-E labs and restart Procardia 30 mg XL 1 by mouth daily.  Will repeat blood pressure in 3 days and reevaluate.  If blood pressure continues to be elevated consider giving steroids.

## 2017-08-10 ENCOUNTER — ROUTINE PRENATAL (OUTPATIENT)
Dept: OBSTETRICS AND GYNECOLOGY | Facility: CLINIC | Age: 37
End: 2017-08-10
Payer: COMMERCIAL

## 2017-08-10 VITALS — BODY MASS INDEX: 36.56 KG/M2 | SYSTOLIC BLOOD PRESSURE: 138 MMHG | WEIGHT: 226.5 LBS | DIASTOLIC BLOOD PRESSURE: 78 MMHG

## 2017-08-10 DIAGNOSIS — O16.3 HYPERTENSION AFFECTING PREGNANCY, THIRD TRIMESTER: ICD-10-CM

## 2017-08-10 DIAGNOSIS — Z34.93 ENCOUNTER FOR SUPERVISION OF NORMAL PREGNANCY IN THIRD TRIMESTER, UNSPECIFIED GRAVIDITY: Primary | ICD-10-CM

## 2017-08-10 PROCEDURE — 0502F SUBSEQUENT PRENATAL CARE: CPT | Mod: S$GLB,,, | Performed by: OBSTETRICS & GYNECOLOGY

## 2017-08-10 PROCEDURE — 99999 PR PBB SHADOW E&M-EST. PATIENT-LVL II: CPT | Mod: PBBFAC,,, | Performed by: OBSTETRICS & GYNECOLOGY

## 2017-08-10 NOTE — PROGRESS NOTES
No c/o Doing well BP looks great and preE labs are normal. On procardia 30mg daily. F/u 2 weeks and then weekly

## 2017-08-14 ENCOUNTER — OFFICE VISIT (OUTPATIENT)
Dept: MATERNAL FETAL MEDICINE | Facility: CLINIC | Age: 37
End: 2017-08-14
Attending: OBSTETRICS & GYNECOLOGY
Payer: COMMERCIAL

## 2017-08-14 DIAGNOSIS — Z36.89 ENCOUNTER FOR ULTRASOUND TO CHECK FETAL GROWTH: ICD-10-CM

## 2017-08-14 DIAGNOSIS — O09.529 AMA (ADVANCED MATERNAL AGE) MULTIGRAVIDA 35+, UNSPECIFIED TRIMESTER: ICD-10-CM

## 2017-08-14 PROCEDURE — 99499 UNLISTED E&M SERVICE: CPT | Mod: S$GLB,,, | Performed by: OBSTETRICS & GYNECOLOGY

## 2017-08-14 PROCEDURE — 76816 OB US FOLLOW-UP PER FETUS: CPT | Mod: S$GLB,,, | Performed by: OBSTETRICS & GYNECOLOGY

## 2017-08-14 NOTE — LETTER
August 14, 2017      Asher Gannon MD  [address]  [address]  [facility] [address]           [facility]  [address]  [facility] [address]  Phone: [phone]          Patient: Vika Donis   MR Number: [id number]   YOB: 1980   Date of Visit: 8/14/2017       Dear Dr. Asher Gannon:    Thank you for referring Vika Donis to me for evaluation. Attached you will find relevant portions of my assessment and plan of care.    If you have questions, please do not hesitate to call me. I look forward to following Vika Donis along with you.    Sincerely,    Chanel Zhou MD    Enclosure  CC:  No Recipients    If you would like to receive this communication electronically, please contact [email or web address] or [phone] to request more information on [facility] Link access.    For providers and/or their staff who would like to refer a patient to [facility], please contact us through our one-stop-shop provider referral line, [facility] , at [phone].    If you feel you have received this communication in error or would no longer like to receive these types of communications, please e-mail [email or web address]

## 2017-08-16 ENCOUNTER — PATIENT MESSAGE (OUTPATIENT)
Dept: OBSTETRICS AND GYNECOLOGY | Facility: CLINIC | Age: 37
End: 2017-08-16

## 2017-08-21 ENCOUNTER — ROUTINE PRENATAL (OUTPATIENT)
Dept: OBSTETRICS AND GYNECOLOGY | Facility: CLINIC | Age: 37
End: 2017-08-21
Payer: COMMERCIAL

## 2017-08-21 VITALS
BODY MASS INDEX: 35.94 KG/M2 | DIASTOLIC BLOOD PRESSURE: 88 MMHG | WEIGHT: 222.69 LBS | SYSTOLIC BLOOD PRESSURE: 140 MMHG

## 2017-08-21 DIAGNOSIS — Z34.93 ENCOUNTER FOR SUPERVISION OF NORMAL PREGNANCY IN THIRD TRIMESTER, UNSPECIFIED GRAVIDITY: Primary | ICD-10-CM

## 2017-08-21 DIAGNOSIS — O16.3 HYPERTENSION AFFECTING PREGNANCY, THIRD TRIMESTER: ICD-10-CM

## 2017-08-21 PROCEDURE — 99999 PR PBB SHADOW E&M-EST. PATIENT-LVL II: CPT | Mod: PBBFAC,,, | Performed by: OBSTETRICS & GYNECOLOGY

## 2017-08-21 PROCEDURE — 0502F SUBSEQUENT PRENATAL CARE: CPT | Mod: S$GLB,,, | Performed by: OBSTETRICS & GYNECOLOGY

## 2017-08-24 ENCOUNTER — PATIENT MESSAGE (OUTPATIENT)
Dept: OBSTETRICS AND GYNECOLOGY | Facility: CLINIC | Age: 37
End: 2017-08-24

## 2017-08-25 ENCOUNTER — PATIENT MESSAGE (OUTPATIENT)
Dept: OBSTETRICS AND GYNECOLOGY | Facility: CLINIC | Age: 37
End: 2017-08-25

## 2017-09-01 ENCOUNTER — CLINICAL SUPPORT (OUTPATIENT)
Dept: OBSTETRICS AND GYNECOLOGY | Facility: CLINIC | Age: 37
End: 2017-09-01
Payer: COMMERCIAL

## 2017-09-01 ENCOUNTER — ROUTINE PRENATAL (OUTPATIENT)
Dept: OBSTETRICS AND GYNECOLOGY | Facility: CLINIC | Age: 37
End: 2017-09-01
Payer: COMMERCIAL

## 2017-09-01 VITALS
SYSTOLIC BLOOD PRESSURE: 132 MMHG | DIASTOLIC BLOOD PRESSURE: 78 MMHG | BODY MASS INDEX: 36.67 KG/M2 | WEIGHT: 227.19 LBS

## 2017-09-01 DIAGNOSIS — O09.523 AMA (ADVANCED MATERNAL AGE) MULTIGRAVIDA 35+, THIRD TRIMESTER: ICD-10-CM

## 2017-09-01 DIAGNOSIS — Z23 NEED FOR TDAP VACCINATION: Primary | ICD-10-CM

## 2017-09-01 DIAGNOSIS — Z23 NEED FOR TDAP VACCINATION: ICD-10-CM

## 2017-09-01 DIAGNOSIS — O10.013 PRE-EXISTING ESSENTIAL HYPERTENSION DURING PREGNANCY IN THIRD TRIMESTER: ICD-10-CM

## 2017-09-01 DIAGNOSIS — Z3A.33 33 WEEKS GESTATION OF PREGNANCY: Primary | ICD-10-CM

## 2017-09-01 PROCEDURE — 99999 PR PBB SHADOW E&M-EST. PATIENT-LVL II: CPT | Mod: PBBFAC,,, | Performed by: NURSE PRACTITIONER

## 2017-09-01 PROCEDURE — 0502F SUBSEQUENT PRENATAL CARE: CPT | Mod: S$GLB,,, | Performed by: NURSE PRACTITIONER

## 2017-09-01 PROCEDURE — 90471 IMMUNIZATION ADMIN: CPT | Mod: S$GLB,,, | Performed by: NURSE PRACTITIONER

## 2017-09-01 PROCEDURE — 90715 TDAP VACCINE 7 YRS/> IM: CPT | Mod: S$GLB,,, | Performed by: NURSE PRACTITIONER

## 2017-09-01 NOTE — PATIENT INSTRUCTIONS
FETAL KICK COUNTS  You are the best monitor for how well your baby is doing. The American Congress of Obstetricians and Gynecologists (ACOG) recommends that you time how long it takes you to feel 10 kicks, flutters, swishes, or rolls. Ideally, you want to feel at least 10 movements within 2 hours. You will likely feel 10 movements in less time than that. Please start counting your babys movements twice a day using the following guidelines:  Fetal Kick Counts:  1. Count in the morning to make sure baby moves 5 times within one hour  2. Count in the evening to make sure baby moves 5 times within on hour  NOTE: count movements of any kind: kicks, clutters, swishes, rolls  If at any time you feel the baby is not moving as much as he/she usually does please follow the below:  1. Have something to eat/drink  2. Lie down on your left side in a quiet room with you hand on uterus  3. Count the movements your baby makes in the next hour  4. If you are not able to feel 5 movements in the hour notify provider   As baby grows and you get closer to your due date, the movements will change. With less room inside of your uterus your baby becomes less vigorous. Continue to monitor movements as outlined above.  Do Not compare your babys movements to other pregnant women or previous pregnancies. Each baby has an individual pattern of activity and development. R  Remember you know your fadi better than anyone else. If there is sudden changes in the movement of your baby, notify provider immediately.

## 2017-09-01 NOTE — PROGRESS NOTES
Educational flyer reviewed the Tdap vaccination reviewed and provided to pt. Pt received Tdap injection as ordered by Mei WALKER to leftt deltoid, pt toelrated well. Pt denies pain. Pt instructed and observed for 15 min post injection. No reaction noted.

## 2017-09-01 NOTE — Clinical Note
In looking at the New M guideline sheet recommendation thing since she is AMA and CHTN it recommended MVP once week/NST twice weekly. I didn't see she was scheduled and she asked about NSTs. I wanted to check with you.

## 2017-09-01 NOTE — PROGRESS NOTES
Chief Complaint: Third Trimester Obstetric Visit (27 wks-Delivery)    HPI: Vika Donis is a 37 y.o., , at 33w2d wks here for a routine prenatal visit. She denies any vaginal bleeding, leaking fluids, abnormal vaginal discharge,  or GI complaints. Edema is reported by patient and more at the end of the day and resolves in the morning. [place] contractions are reported by patient. Fetal movement detected at this time. CHTN: taking medications as prescribed    Physical Exam:   FHT:136  FH: 34.5  /78   Wt 103 kg (227 lb 2.9 oz)   LMP 2017   BMI 36.67 kg/m²     Assessment/Plan  1. Third Trimester Pregnancy Routine Visit--patient here for routine prenatal visit doing well. Continue daily prenatal vitamin, second trimester ACOG education topics discussed and handout provided.  2. Circ: yes  3. CHTN--stable and continue medications will ask OBMD about NST  4. Baby Friendly/Breastfeeding Education-- discussed with patient  5. GBS screening--screening discussed and at 36 weeks  6. Pediatrician and Infant Feeding--discussed breastfeeding with patient and pt plans on BF;  Patient has not decided on Pede MD and list provided today   7. Fetal Movement--discussed and handout provided to patient on monitoring fetal movement  8. Post-partum Contraception Method--discussed options with patient and patient desires BTL  9. PTL--discussed with patient s/s of PTL and real vs false labor and when to report to L&D or call    RTC prn as schedule with OBMD

## 2017-09-05 ENCOUNTER — TELEPHONE (OUTPATIENT)
Dept: OBSTETRICS AND GYNECOLOGY | Facility: CLINIC | Age: 37
End: 2017-09-05

## 2017-09-05 NOTE — TELEPHONE ENCOUNTER
Gave patient the option to follow Debbie to [facility] on 9/18/17 or stay in [place] and see Aler. Patient opted to go with Debbie. Appt changed in [facility].

## 2017-09-11 ENCOUNTER — PROCEDURE VISIT (OUTPATIENT)
Dept: OBSTETRICS AND GYNECOLOGY | Facility: CLINIC | Age: 37
End: 2017-09-11
Payer: COMMERCIAL

## 2017-09-11 ENCOUNTER — ROUTINE PRENATAL (OUTPATIENT)
Dept: OBSTETRICS AND GYNECOLOGY | Facility: CLINIC | Age: 37
End: 2017-09-11
Payer: COMMERCIAL

## 2017-09-11 ENCOUNTER — HOSPITAL ENCOUNTER (EMERGENCY)
Facility: OTHER | Age: 37
Discharge: HOME | End: 2017-09-11
Attending: OBSTETRICS & GYNECOLOGY
Payer: COMMERCIAL

## 2017-09-11 VITALS
BODY MASS INDEX: 36.65 KG/M2 | WEIGHT: 227.06 LBS | SYSTOLIC BLOOD PRESSURE: 162 MMHG | DIASTOLIC BLOOD PRESSURE: 90 MMHG

## 2017-09-11 VITALS
HEART RATE: 106 BPM | OXYGEN SATURATION: 97 % | RESPIRATION RATE: 16 BRPM | DIASTOLIC BLOOD PRESSURE: 80 MMHG | TEMPERATURE: 96 F | SYSTOLIC BLOOD PRESSURE: 138 MMHG

## 2017-09-11 DIAGNOSIS — Z3A.34 34 WEEKS GESTATION OF PREGNANCY: ICD-10-CM

## 2017-09-11 DIAGNOSIS — O16.3 HYPERTENSION AFFECTING PREGNANCY, THIRD TRIMESTER: ICD-10-CM

## 2017-09-11 DIAGNOSIS — O09.523 ELDERLY MULTIGRAVIDA IN THIRD TRIMESTER: ICD-10-CM

## 2017-09-11 DIAGNOSIS — O10.013 PRE-EXISTING ESSENTIAL HYPERTENSION DURING PREGNANCY IN THIRD TRIMESTER: ICD-10-CM

## 2017-09-11 DIAGNOSIS — Z34.93 ENCOUNTER FOR SUPERVISION OF NORMAL PREGNANCY IN THIRD TRIMESTER, UNSPECIFIED GRAVIDITY: ICD-10-CM

## 2017-09-11 DIAGNOSIS — O10.013 PRE-EXISTING ESSENTIAL HYPERTENSION DURING PREGNANCY IN THIRD TRIMESTER: Primary | ICD-10-CM

## 2017-09-11 DIAGNOSIS — Z36.85 ANTENATAL SCREENING FOR STREPTOCOCCUS B: ICD-10-CM

## 2017-09-11 DIAGNOSIS — O16.3 HYPERTENSION AFFECTING PREGNANCY, THIRD TRIMESTER: Primary | ICD-10-CM

## 2017-09-11 LAB
ALBUMIN SERPL BCP-MCNC: 2.6 G/DL
ALP SERPL-CCNC: 91 U/L
ALT SERPL W/O P-5'-P-CCNC: 15 U/L
ANION GAP SERPL CALC-SCNC: 9 MMOL/L
AST SERPL-CCNC: 21 U/L
BASOPHILS # BLD AUTO: 0 K/UL
BASOPHILS NFR BLD: 0 %
BILIRUB SERPL-MCNC: 0.2 MG/DL
BUN SERPL-MCNC: 6 MG/DL
CALCIUM SERPL-MCNC: 9.6 MG/DL
CHLORIDE SERPL-SCNC: 108 MMOL/L
CO2 SERPL-SCNC: 21 MMOL/L
CREAT SERPL-MCNC: 0.6 MG/DL
CREAT UR-MCNC: 59.5 MG/DL
DIFFERENTIAL METHOD: ABNORMAL
EOSINOPHIL # BLD AUTO: 0.1 K/UL
EOSINOPHIL NFR BLD: 0.9 %
ERYTHROCYTE [DISTWIDTH] IN BLOOD BY AUTOMATED COUNT: 13.3 %
EST. GFR  (AFRICAN AMERICAN): >60 ML/MIN/1.73 M^2
EST. GFR  (NON AFRICAN AMERICAN): >60 ML/MIN/1.73 M^2
GLUCOSE SERPL-MCNC: 115 MG/DL
HCT VFR BLD AUTO: 31.9 %
HGB BLD-MCNC: 10.9 G/DL
LYMPHOCYTES # BLD AUTO: 1.4 K/UL
LYMPHOCYTES NFR BLD: 26.4 %
MCH RBC QN AUTO: 29.9 PG
MCHC RBC AUTO-ENTMCNC: 34.2 G/DL
MCV RBC AUTO: 88 FL
MONOCYTES # BLD AUTO: 0.4 K/UL
MONOCYTES NFR BLD: 7.8 %
NEUTROPHILS # BLD AUTO: 3.4 K/UL
NEUTROPHILS NFR BLD: 64 %
PLATELET # BLD AUTO: 191 K/UL
PMV BLD AUTO: 10.1 FL
POTASSIUM SERPL-SCNC: 3.5 MMOL/L
PROT SERPL-MCNC: 6.5 G/DL
PROT UR-MCNC: 10 MG/DL
PROT/CREAT RATIO, UR: 0.17
RBC # BLD AUTO: 3.64 M/UL
SODIUM SERPL-SCNC: 138 MMOL/L
WBC # BLD AUTO: 5.38 K/UL

## 2017-09-11 PROCEDURE — 59025 FETAL NON-STRESS TEST: CPT

## 2017-09-11 PROCEDURE — 76819 FETAL BIOPHYS PROFIL W/O NST: CPT | Mod: S$GLB,,, | Performed by: OBSTETRICS & GYNECOLOGY

## 2017-09-11 PROCEDURE — 82570 ASSAY OF URINE CREATININE: CPT

## 2017-09-11 PROCEDURE — 99284 EMERGENCY DEPT VISIT MOD MDM: CPT | Mod: 25

## 2017-09-11 PROCEDURE — 85025 COMPLETE CBC W/AUTO DIFF WBC: CPT

## 2017-09-11 PROCEDURE — 99999 PR PBB SHADOW E&M-EST. PATIENT-LVL III: CPT | Mod: PBBFAC,,, | Performed by: OBSTETRICS & GYNECOLOGY

## 2017-09-11 PROCEDURE — 0502F SUBSEQUENT PRENATAL CARE: CPT | Mod: S$GLB,,, | Performed by: OBSTETRICS & GYNECOLOGY

## 2017-09-11 PROCEDURE — 59025 FETAL NON-STRESS TEST: CPT | Mod: 26,,, | Performed by: OBSTETRICS & GYNECOLOGY

## 2017-09-11 PROCEDURE — 80053 COMPREHEN METABOLIC PANEL: CPT

## 2017-09-11 PROCEDURE — 87081 CULTURE SCREEN ONLY: CPT

## 2017-09-11 PROCEDURE — 99284 EMERGENCY DEPT VISIT MOD MDM: CPT | Mod: 25,,, | Performed by: OBSTETRICS & GYNECOLOGY

## 2017-09-11 PROCEDURE — 76816 OB US FOLLOW-UP PER FETUS: CPT | Mod: S$GLB,,, | Performed by: OBSTETRICS & GYNECOLOGY

## 2017-09-11 NOTE — ED PROVIDER NOTES
Encounter Date: 2017       History     Chief Complaint   Patient presents with    Hypertension     Vika Donis is a 37 y.o.  at 34w5d presents presents from Dr. Gannon's clinic after having an elevated blood pressure reading of 160/90.  In the clinic she was given an NST as well as a BPP, both of which were reassuring.  She denies any complaints at this time including headache, vision changes, right upper quadrant pain, chest pain or shortness of breath.  She also denies uterine contractions, vaginal bleeding or leakage of fluid from the vagina.  She states that feel movements are active and consistent.      This IUP is complicated by a history of CHTN and a history of Preeclamsia.              Review of patient's allergies indicates:  No Known Allergies  Past Medical History:   Diagnosis Date    Abnormal Pap smear of cervix      HPV+    History of multiple miscarriages     HPV (human papilloma virus) infection     Hypertension      Past Surgical History:   Procedure Laterality Date     SECTION      x2     Family History   Problem Relation Age of Onset    Diabetes Paternal Grandmother     Hypertension Mother     Breast cancer Maternal Aunt     Cancer Maternal Aunt     Colon cancer Neg Hx     Ovarian cancer Neg Hx      Social History   Substance Use Topics    Smoking status: Never Smoker    Smokeless tobacco: Never Used    Alcohol use Yes      Comment: once a week      Review of Systems   Constitutional: Negative for chills and fever.   HENT: Negative for sore throat.    Eyes: Negative for visual disturbance.   Respiratory: Negative for shortness of breath.    Cardiovascular: Negative for chest pain.   Gastrointestinal: Negative for abdominal pain, diarrhea, nausea and vomiting.   Genitourinary: Negative for dysuria, hematuria, vaginal bleeding and vaginal discharge.   Musculoskeletal: Negative for back pain.   Skin: Negative for rash.   Neurological: Negative for dizziness,  weakness and headaches.   Hematological: Does not bruise/bleed easily.       Physical Exam     Initial Vitals   BP Pulse Resp Temp SpO2   17 1331 17 1331 17 1331 17 1330 17 1330   (!) 148/81 105 16 (!) 95.7 °F (35.4 °C) 100 %      MAP       17 1331       103.33         Physical Exam    Constitutional: She appears well-developed and well-nourished. She is not diaphoretic. No distress.   HENT:   Head: Normocephalic and atraumatic.   Eyes: Conjunctivae are normal.   Neck: Neck supple.   Cardiovascular: Normal rate.   Pulmonary/Chest: Breath sounds normal. No respiratory distress.   Abdominal: Soft. She exhibits no distension (gravid uterus). There is no tenderness. There is no rebound and no guarding.   Genitourinary:   Genitourinary Comments: Fundus Nt, appropriate for gestational age   Musculoskeletal: She exhibits no edema or tenderness.   Neurological: She is alert and oriented to person, place, and time. She has normal strength and normal reflexes. No cranial nerve deficit.   Skin: Skin is warm and dry.   Psychiatric: She has a normal mood and affect. Her behavior is normal. Judgment and thought content normal.         ED Course   Obtain Fetal nonstress test (NST)  Date/Time: 2017 9:21 PM  Performed by: KADIE NICHOLS  Authorized by: KADIE NICHOLS     Nonstress Test:     Variability:  6-25 BPM    Decelerations:  None    Accelerations:  15 bpm    Baseline:  140    Contractions:  Not present  Biophysical Profile:     Nonstress Test Interpretation: reactive      Overall Impression:  Reassuring        Labs Reviewed   CBC W/ AUTO DIFFERENTIAL - Abnormal; Notable for the following:        Result Value    RBC 3.64 (*)     Hemoglobin 10.9 (*)     Hematocrit 31.9 (*)     All other components within normal limits   PROTEIN / CREATININE RATIO, URINE   COMPREHENSIVE METABOLIC PANEL             Medical Decision Making:   Initial Assessment:   37 y.o.  at 34w5d with an elevated  blood pressure reading    ED Management:  CBC - 10/32/191  CMP - Without clinically significant findings. LFTs and Platelets stable  P/C Ratio - Neg 0.17  NST - Cat 1 Reactive               Attending Attestation:   Physician Attestation Statement for Resident:  As the supervising MD   Physician Attestation Statement: I have personally seen and examined this patient.   I agree with the above history. -:   As the supervising MD I agree with the above PE.    As the supervising MD I agree with the above treatment, course, plan, and disposition.   -: Patient evaluated and found to be stable, agree with resident's assessment and plan.  I was personally present during the critical portions of the procedure(s) performed by the resident and was immediately available in the ED to provide services and assistance as needed during the entire procedure.  I have reviewed and agree with the residents interpretation of the following: lab data.  I have reviewed the following: old records at this facility.                    ED Course as of Sep 11 2112   Mon Sep 11, 2017   1343  at 34 5/7 weeks sent from Dr. Gannon's office for pre-e evaluation. History of CHTN compliant with procardia, no sx pre-e, /90 in the office, ultrasound and BPP done in the office today with reassuring findings. Scheduled for RLTCS next month.  reactive no decels, no ctx noted. Labs sent, serial BP, 1st BP stable in mild range  [AR]   1443 BP stable in mild range, labs ok.  Discussed with Dr. Gannon. Plan discharge home with follow up as scheduled.   [AR]      ED Course User Index  [AR] Matilda Pickens MD     Clinical Impression:   The primary encounter diagnosis was Pre-existing essential hypertension during pregnancy in third trimester. Diagnoses of Elderly multigravida in third trimester and 34 weeks gestation of pregnancy were also pertinent to this visit.    Disposition:   Disposition: Discharged  Condition: Stable    - No evidence of  preeclampsia on work-up today. NST Reassuring.  - PC Ratio of 0.17 in absence of neg clinical symptoms or lab abnormalities.  - Feel patient is stable for discharge.   - Do not feel additional work-up is needed at this time.  - Labor precautions provided  - Patient to follow-up with OBGYN as scheduled    Tani Currie M.D.  PGY1 OB/GYN                         Tani Allen MD  Resident  09/11/17 2126       Matilda Pickens MD  09/11/17 2130

## 2017-09-11 NOTE — PROGRESS NOTES
37-year-old para 2 with chronic hypertension on Procardia XL 30 mg daily.  Took her blood pressure meds this morning.  This am was blood pressure of 160/90 today her in clinic.  Will do ultrasound and BPP here in clinic.  Group B strep culture performed today.  Cervix long thick and closed.  Patient with a previous section at 36 weeks for pre-E at another hospital / MD.  Patient denies headache blurred vision or right upper quadrant pain.  Will do ultrasound today for size and position and we'll send patient to the OB ED for serial blood pressures NST and pre-E labs.  Discussed 34 week expectations of prematurity if we have to deliver.  If her blood pressures are normal at the OB ED and pre-E labs are normal.  Patient will be discharged with twice weekly NSTs and blood pressure monitoring.

## 2017-09-14 ENCOUNTER — CLINICAL SUPPORT (OUTPATIENT)
Dept: OBSTETRICS AND GYNECOLOGY | Facility: CLINIC | Age: 37
End: 2017-09-14
Payer: COMMERCIAL

## 2017-09-14 VITALS
SYSTOLIC BLOOD PRESSURE: 134 MMHG | HEIGHT: 65 IN | WEIGHT: 229.63 LBS | DIASTOLIC BLOOD PRESSURE: 78 MMHG | BODY MASS INDEX: 38.26 KG/M2

## 2017-09-14 DIAGNOSIS — O16.3 HYPERTENSION AFFECTING PREGNANCY, THIRD TRIMESTER: ICD-10-CM

## 2017-09-14 DIAGNOSIS — Z3A.35 35 WEEKS GESTATION OF PREGNANCY: Primary | ICD-10-CM

## 2017-09-14 LAB — BACTERIA SPEC AEROBE CULT: NORMAL

## 2017-09-14 PROCEDURE — 90471 IMMUNIZATION ADMIN: CPT | Mod: S$GLB,,, | Performed by: NURSE PRACTITIONER

## 2017-09-14 PROCEDURE — 90686 IIV4 VACC NO PRSV 0.5 ML IM: CPT | Mod: S$GLB,,, | Performed by: NURSE PRACTITIONER

## 2017-09-14 PROCEDURE — 59025 FETAL NON-STRESS TEST: CPT | Mod: 26,S$GLB,, | Performed by: NURSE PRACTITIONER

## 2017-09-14 PROCEDURE — 0502F SUBSEQUENT PRENATAL CARE: CPT | Mod: S$GLB,,, | Performed by: NURSE PRACTITIONER

## 2017-09-14 PROCEDURE — 99999 PR PBB SHADOW E&M-EST. PATIENT-LVL III: CPT | Mod: PBBFAC,,, | Performed by: NURSE PRACTITIONER

## 2017-09-14 NOTE — PROGRESS NOTES
Denies h/a, dizziness, blurred vision or R upper epigastric pain today.  Denies ctx's; reports good FM.  Flu shot given today.  NST reactive.     FETAL ASSESSMENT REPORT    RE: Vika Donis  MRN:  [id number]  :  1980  AGE:  37 y.o.    Date:  2017    REFERRAL PHYSICIAN: Bone    Allergies: Review of patient's allergies indicates no known allergies.    Vika is a 37 y.o.  at 35w1d gestational age here today for a NST.    EDC:   10/18/2017, by Last Menstrual Period    MEDICATIONS AT TIME OF TEST:  Current Outpatient Prescriptions   Medication Sig Dispense Refill    nifedipine (PROCARDIA-XL) 30 MG (OSM) 24 hr tablet Take 1 tablet (30 mg total) by mouth once daily. 30 tablet 11    PNV 22/IRON,GLUC/FOLIC/DSS/DHA (PNV OB+DHA ORAL) Take by mouth.      BREAST PUMP MISC        No current facility-administered medications for this visit.        NST Indication: DEBBY HARRISON    Interpretation:  132 BPM baseline    Variability:  mod    Accelerations:  present    Decelerations:  none    Uterine Activity:  none    Assessment: (NST findings):  REACTIVE    Plan:  f/u bi-weekly NST's as ordered.

## 2017-09-14 NOTE — PROGRESS NOTES
Home BP's over past 3 days:  Ranged from 134-149 systolic, and from 78-87 diastolic (had 1 diastolic of 90)

## 2017-09-18 ENCOUNTER — PROCEDURE VISIT (OUTPATIENT)
Dept: OBSTETRICS AND GYNECOLOGY | Facility: CLINIC | Age: 37
End: 2017-09-18
Payer: COMMERCIAL

## 2017-09-18 ENCOUNTER — CLINICAL SUPPORT (OUTPATIENT)
Dept: OBSTETRICS AND GYNECOLOGY | Facility: CLINIC | Age: 37
End: 2017-09-18
Payer: COMMERCIAL

## 2017-09-18 VITALS
DIASTOLIC BLOOD PRESSURE: 78 MMHG | SYSTOLIC BLOOD PRESSURE: 122 MMHG | HEIGHT: 65 IN | WEIGHT: 228.31 LBS | BODY MASS INDEX: 38.04 KG/M2

## 2017-09-18 DIAGNOSIS — O16.3 HYPERTENSION AFFECTING PREGNANCY, THIRD TRIMESTER: ICD-10-CM

## 2017-09-18 PROCEDURE — 99999 PR PBB SHADOW E&M-EST. PATIENT-LVL II: CPT | Mod: PBBFAC,,, | Performed by: STUDENT IN AN ORGANIZED HEALTH CARE EDUCATION/TRAINING PROGRAM

## 2017-09-18 PROCEDURE — 76819 FETAL BIOPHYS PROFIL W/O NST: CPT | Mod: S$GLB,,, | Performed by: OBSTETRICS & GYNECOLOGY

## 2017-09-18 PROCEDURE — 0502F SUBSEQUENT PRENATAL CARE: CPT | Mod: S$GLB,,, | Performed by: STUDENT IN AN ORGANIZED HEALTH CARE EDUCATION/TRAINING PROGRAM

## 2017-09-18 NOTE — PROGRESS NOTES
BP normal today.  NST nonreactive today.  BPP .  Continue NSTs twice weekly and procardia 30.  Patient also desires BTL at time of .  GBS results discussed.  All questions answered.

## 2017-09-19 ENCOUNTER — PATIENT MESSAGE (OUTPATIENT)
Dept: OBSTETRICS AND GYNECOLOGY | Facility: CLINIC | Age: 37
End: 2017-09-19

## 2017-09-21 ENCOUNTER — LAB VISIT (OUTPATIENT)
Dept: LAB | Facility: HOSPITAL | Age: 37
End: 2017-09-21
Attending: OBSTETRICS & GYNECOLOGY
Payer: COMMERCIAL

## 2017-09-21 ENCOUNTER — CLINICAL SUPPORT (OUTPATIENT)
Dept: OBSTETRICS AND GYNECOLOGY | Facility: CLINIC | Age: 37
End: 2017-09-21
Payer: COMMERCIAL

## 2017-09-21 DIAGNOSIS — Z36.89 NST (NON-STRESS TEST) REACTIVE: Primary | ICD-10-CM

## 2017-09-21 DIAGNOSIS — Z3A.36 36 WEEKS GESTATION OF PREGNANCY: ICD-10-CM

## 2017-09-21 DIAGNOSIS — O09.523 ELDERLY MULTIGRAVIDA IN THIRD TRIMESTER: ICD-10-CM

## 2017-09-21 DIAGNOSIS — O16.3 HYPERTENSION AFFECTING PREGNANCY, THIRD TRIMESTER: ICD-10-CM

## 2017-09-21 LAB
ALBUMIN SERPL BCP-MCNC: 2.7 G/DL
ALP SERPL-CCNC: 110 U/L
ALT SERPL W/O P-5'-P-CCNC: 15 U/L
ANION GAP SERPL CALC-SCNC: 7 MMOL/L
AST SERPL-CCNC: 22 U/L
BASOPHILS # BLD AUTO: 0.01 K/UL
BASOPHILS NFR BLD: 0.2 %
BILIRUB SERPL-MCNC: 0.3 MG/DL
BUN SERPL-MCNC: 5 MG/DL
CALCIUM SERPL-MCNC: 9.3 MG/DL
CHLORIDE SERPL-SCNC: 106 MMOL/L
CO2 SERPL-SCNC: 24 MMOL/L
CREAT SERPL-MCNC: 0.7 MG/DL
DIFFERENTIAL METHOD: ABNORMAL
EOSINOPHIL # BLD AUTO: 0.1 K/UL
EOSINOPHIL NFR BLD: 1 %
ERYTHROCYTE [DISTWIDTH] IN BLOOD BY AUTOMATED COUNT: 13.5 %
EST. GFR  (AFRICAN AMERICAN): >60 ML/MIN/1.73 M^2
EST. GFR  (NON AFRICAN AMERICAN): >60 ML/MIN/1.73 M^2
GLUCOSE SERPL-MCNC: 77 MG/DL
HCT VFR BLD AUTO: 33.6 %
HGB BLD-MCNC: 11.2 G/DL
LYMPHOCYTES # BLD AUTO: 1.5 K/UL
LYMPHOCYTES NFR BLD: 28.5 %
MCH RBC QN AUTO: 29.5 PG
MCHC RBC AUTO-ENTMCNC: 33.3 G/DL
MCV RBC AUTO: 88 FL
MONOCYTES # BLD AUTO: 0.7 K/UL
MONOCYTES NFR BLD: 13.2 %
NEUTROPHILS # BLD AUTO: 2.9 K/UL
NEUTROPHILS NFR BLD: 56.5 %
PLATELET # BLD AUTO: 204 K/UL
PMV BLD AUTO: 10.9 FL
POTASSIUM SERPL-SCNC: 3.5 MMOL/L
PROT SERPL-MCNC: 6.6 G/DL
RBC # BLD AUTO: 3.8 M/UL
SODIUM SERPL-SCNC: 137 MMOL/L
URATE SERPL-MCNC: 3.6 MG/DL
WBC # BLD AUTO: 5.16 K/UL

## 2017-09-21 PROCEDURE — 99999 PR PBB SHADOW E&M-EST. PATIENT-LVL III: CPT | Mod: PBBFAC,,, | Performed by: NURSE PRACTITIONER

## 2017-09-21 PROCEDURE — 85025 COMPLETE CBC W/AUTO DIFF WBC: CPT

## 2017-09-21 PROCEDURE — 84550 ASSAY OF BLOOD/URIC ACID: CPT

## 2017-09-21 PROCEDURE — 0502F SUBSEQUENT PRENATAL CARE: CPT | Mod: S$GLB,,, | Performed by: NURSE PRACTITIONER

## 2017-09-21 PROCEDURE — 80053 COMPREHEN METABOLIC PANEL: CPT

## 2017-09-25 ENCOUNTER — CLINICAL SUPPORT (OUTPATIENT)
Dept: OBSTETRICS AND GYNECOLOGY | Facility: CLINIC | Age: 37
End: 2017-09-25
Payer: COMMERCIAL

## 2017-09-25 VITALS
BODY MASS INDEX: 38.48 KG/M2 | WEIGHT: 231.25 LBS | WEIGHT: 229.25 LBS | DIASTOLIC BLOOD PRESSURE: 92 MMHG | BODY MASS INDEX: 38.15 KG/M2 | DIASTOLIC BLOOD PRESSURE: 90 MMHG | SYSTOLIC BLOOD PRESSURE: 140 MMHG | SYSTOLIC BLOOD PRESSURE: 140 MMHG

## 2017-09-25 DIAGNOSIS — O16.3 HYPERTENSION AFFECTING PREGNANCY, THIRD TRIMESTER: ICD-10-CM

## 2017-09-25 DIAGNOSIS — O34.219 PREVIOUS CESAREAN DELIVERY AFFECTING PREGNANCY: Primary | ICD-10-CM

## 2017-09-25 PROCEDURE — 99999 PR PBB SHADOW E&M-EST. PATIENT-LVL III: CPT | Mod: PBBFAC,,, | Performed by: OBSTETRICS & GYNECOLOGY

## 2017-09-25 NOTE — PROGRESS NOTES
Here for NST today.  NST reactive (see note below).  Reports decrease in fetal movement; reports occasional headaches; denies dizziness, blurred vision, or R upper epigastric pain.   Doing well overall, just very fatigued.  Labor/bleeding/decreased FM/Pre-E precautions discussed at length with patient, with good verbal understanding.     FETAL ASSESSMENT REPORT    RE: Vika Donis  MRN:  [id number]  :  1980  AGE:  37 y.o.    Date:  2017    REFERRAL PHYSICIAN: Bone    Allergies: Review of patient's allergies indicates no known allergies.    Vika is a 37 y.o.  at 36w5d gestational age here today for a NST.    EDC:   10/18/2017, by Last Menstrual Period    MEDICATIONS AT TIME OF TEST:  Current Outpatient Prescriptions   Medication Sig Dispense Refill    BREAST PUMP MISC       nifedipine (PROCARDIA-XL) 30 MG (OSM) 24 hr tablet Take 1 tablet (30 mg total) by mouth once daily. 30 tablet 11    PNV 22/IRON,GLUC/FOLIC/DSS/DHA (PNV OB+DHA ORAL) Take by mouth.       No current facility-administered medications for this visit.        NST Indication: DEBBY HARRISON    Interpretation:  135 BPM baseline    Variability:  mod    Accelerations:  present    Decelerations:  none noted    Uterine Activity:  none    Assessment: (NST findings):  REACTIVE    Plan:  continue NST's as ordered.

## 2017-09-25 NOTE — PROGRESS NOTES
Pre-E las done on Thursday were normal.  Patent denies headache blurred vision or right upper quadrant pain.  Overall feeling well.  Pt with chronic hypertension currently on Procardia 30mg XL. Blood pressure today 140/90. Will Move c/s up to 38 weeks due to BP issues and cont to follow with NST 2 x week and weekly labs   Needs repeat labs on Thurs.   NST reactive good accels No delels   No ctx on monitor

## 2017-09-28 ENCOUNTER — PROCEDURE VISIT (OUTPATIENT)
Dept: OBSTETRICS AND GYNECOLOGY | Facility: CLINIC | Age: 37
End: 2017-09-28
Payer: COMMERCIAL

## 2017-09-28 ENCOUNTER — LAB VISIT (OUTPATIENT)
Dept: LAB | Facility: HOSPITAL | Age: 37
End: 2017-09-28
Attending: OBSTETRICS & GYNECOLOGY
Payer: COMMERCIAL

## 2017-09-28 ENCOUNTER — CLINICAL SUPPORT (OUTPATIENT)
Dept: OBSTETRICS AND GYNECOLOGY | Facility: CLINIC | Age: 37
End: 2017-09-28
Payer: COMMERCIAL

## 2017-09-28 VITALS — SYSTOLIC BLOOD PRESSURE: 146 MMHG | WEIGHT: 229.5 LBS | DIASTOLIC BLOOD PRESSURE: 86 MMHG | BODY MASS INDEX: 38.19 KG/M2

## 2017-09-28 DIAGNOSIS — O16.3 HYPERTENSION AFFECTING PREGNANCY, THIRD TRIMESTER: ICD-10-CM

## 2017-09-28 DIAGNOSIS — O28.8 NON-REACTIVE NST (NON-STRESS TEST): Primary | ICD-10-CM

## 2017-09-28 DIAGNOSIS — O28.8 NON-REACTIVE NST (NON-STRESS TEST): ICD-10-CM

## 2017-09-28 DIAGNOSIS — Z3A.37 37 WEEKS GESTATION OF PREGNANCY: ICD-10-CM

## 2017-09-28 DIAGNOSIS — O10.013 PRE-EXISTING ESSENTIAL HYPERTENSION DURING PREGNANCY IN THIRD TRIMESTER: ICD-10-CM

## 2017-09-28 DIAGNOSIS — O09.523 ELDERLY MULTIGRAVIDA IN THIRD TRIMESTER: ICD-10-CM

## 2017-09-28 LAB
ALBUMIN SERPL BCP-MCNC: 2.8 G/DL
ALP SERPL-CCNC: 123 U/L
ALT SERPL W/O P-5'-P-CCNC: 18 U/L
ANION GAP SERPL CALC-SCNC: 9 MMOL/L
AST SERPL-CCNC: 28 U/L
BASOPHILS # BLD AUTO: 0.01 K/UL
BASOPHILS NFR BLD: 0.2 %
BILIRUB SERPL-MCNC: 0.3 MG/DL
BUN SERPL-MCNC: 7 MG/DL
CALCIUM SERPL-MCNC: 9.7 MG/DL
CHLORIDE SERPL-SCNC: 106 MMOL/L
CO2 SERPL-SCNC: 23 MMOL/L
CREAT SERPL-MCNC: 0.7 MG/DL
DIFFERENTIAL METHOD: ABNORMAL
EOSINOPHIL # BLD AUTO: 0.1 K/UL
EOSINOPHIL NFR BLD: 1.3 %
ERYTHROCYTE [DISTWIDTH] IN BLOOD BY AUTOMATED COUNT: 13.4 %
EST. GFR  (AFRICAN AMERICAN): >60 ML/MIN/1.73 M^2
EST. GFR  (NON AFRICAN AMERICAN): >60 ML/MIN/1.73 M^2
GLUCOSE SERPL-MCNC: 80 MG/DL
HCT VFR BLD AUTO: 34.3 %
HGB BLD-MCNC: 11.7 G/DL
LYMPHOCYTES # BLD AUTO: 1.4 K/UL
LYMPHOCYTES NFR BLD: 25.8 %
MCH RBC QN AUTO: 29.5 PG
MCHC RBC AUTO-ENTMCNC: 34.1 G/DL
MCV RBC AUTO: 87 FL
MONOCYTES # BLD AUTO: 0.7 K/UL
MONOCYTES NFR BLD: 12.2 %
NEUTROPHILS # BLD AUTO: 3.3 K/UL
NEUTROPHILS NFR BLD: 60 %
PLATELET # BLD AUTO: 219 K/UL
PMV BLD AUTO: 10.8 FL
POTASSIUM SERPL-SCNC: 3.9 MMOL/L
PROT SERPL-MCNC: 7.2 G/DL
RBC # BLD AUTO: 3.96 M/UL
SODIUM SERPL-SCNC: 138 MMOL/L
URATE SERPL-MCNC: 3.8 MG/DL
WBC # BLD AUTO: 5.47 K/UL

## 2017-09-28 PROCEDURE — 59025 FETAL NON-STRESS TEST: CPT | Mod: S$GLB,,, | Performed by: NURSE PRACTITIONER

## 2017-09-28 PROCEDURE — 80053 COMPREHEN METABOLIC PANEL: CPT

## 2017-09-28 PROCEDURE — 84550 ASSAY OF BLOOD/URIC ACID: CPT

## 2017-09-28 PROCEDURE — 85025 COMPLETE CBC W/AUTO DIFF WBC: CPT

## 2017-09-28 PROCEDURE — 76819 FETAL BIOPHYS PROFIL W/O NST: CPT | Mod: S$GLB,,, | Performed by: OBSTETRICS & GYNECOLOGY

## 2017-09-28 PROCEDURE — 99999 PR PBB SHADOW E&M-EST. PATIENT-LVL II: CPT | Mod: PBBFAC,,, | Performed by: NURSE PRACTITIONER

## 2017-09-28 NOTE — PROCEDURES
Procedures   Obstetrical ultrasound completed today.  See report in imaging section of [facility].

## 2017-09-28 NOTE — PROGRESS NOTES
Here for NST today due to DEBBY HARRISON.    NST - Category 2 - indeterminate (reviewed with )  BPP done -   Labs collected today.  Pt denies h/a, dizziness, blurred vision, and R upper epigastric pain.  Labor/bleeding/decreased FM/Pre-E precautions given.     FETAL ASSESSMENT REPORT    RE: Vika Donis  MRN:  [id number]  :  1980  AGE:  37 y.o.    Date:  2017    REFERRAL PHYSICIAN: Bone    Allergies: Review of patient's allergies indicates no known allergies.    Vika is a 37 y.o.  at 37w1d gestational age here today for a NST.    EDC:   10/18/2017, by Last Menstrual Period    MEDICATIONS AT TIME OF TEST:  Current Outpatient Prescriptions   Medication Sig Dispense Refill    BREAST PUMP MISC       nifedipine (PROCARDIA-XL) 30 MG (OSM) 24 hr tablet Take 1 tablet (30 mg total) by mouth once daily. 30 tablet 11    PNV 22/IRON,GLUC/FOLIC/DSS/DHA (PNV OB+DHA ORAL) Take by mouth.       No current facility-administered medications for this visit.        NST Indication: AMA, CHTN    Interpretation:  135 BPM baseline    Variability:  mod    Accelerations:  one 15 x 15 during 25 min of monitoring; BPP ordered & completed.    Decelerations:  none    Uterine Activity:  Ctx x 1    Assessment: (NST findings):  Category 2 - Indeterminate    Plan:  continue NST's as ordered.

## 2017-10-02 ENCOUNTER — PROCEDURE VISIT (OUTPATIENT)
Dept: OBSTETRICS AND GYNECOLOGY | Facility: CLINIC | Age: 37
End: 2017-10-02
Payer: COMMERCIAL

## 2017-10-02 ENCOUNTER — CLINICAL SUPPORT (OUTPATIENT)
Dept: OBSTETRICS AND GYNECOLOGY | Facility: CLINIC | Age: 37
End: 2017-10-02
Payer: COMMERCIAL

## 2017-10-02 VITALS — BODY MASS INDEX: 38.3 KG/M2 | DIASTOLIC BLOOD PRESSURE: 84 MMHG | WEIGHT: 230.19 LBS | SYSTOLIC BLOOD PRESSURE: 130 MMHG

## 2017-10-02 DIAGNOSIS — O16.3 HYPERTENSION AFFECTING PREGNANCY, THIRD TRIMESTER: ICD-10-CM

## 2017-10-02 DIAGNOSIS — O09.523 AMA (ADVANCED MATERNAL AGE) MULTIGRAVIDA 35+, THIRD TRIMESTER: Primary | ICD-10-CM

## 2017-10-02 DIAGNOSIS — O09.523 ELDERLY MULTIGRAVIDA IN THIRD TRIMESTER: ICD-10-CM

## 2017-10-02 PROCEDURE — 76819 FETAL BIOPHYS PROFIL W/O NST: CPT | Mod: S$GLB,,, | Performed by: OBSTETRICS & GYNECOLOGY

## 2017-10-02 PROCEDURE — 99999 PR PBB SHADOW E&M-EST. PATIENT-LVL II: CPT | Mod: PBBFAC,,, | Performed by: OBSTETRICS & GYNECOLOGY

## 2017-10-02 NOTE — PROGRESS NOTES
BPP today 8/8 Pt sched for c/s on Wed due to CHTN with worsening BP and breech  Pre E labs last week Normal

## 2017-10-02 NOTE — PROCEDURES
Procedures   Obstetrical ultrasound completed today.  See report in imaging section of [facility].

## 2017-10-03 RX ORDER — SODIUM CITRATE AND CITRIC ACID MONOHYDRATE 334; 500 MG/5ML; MG/5ML
30 SOLUTION ORAL
Status: CANCELLED | OUTPATIENT
Start: 2017-10-03

## 2017-10-03 RX ORDER — SODIUM CHLORIDE, SODIUM LACTATE, POTASSIUM CHLORIDE, CALCIUM CHLORIDE 600; 310; 30; 20 MG/100ML; MG/100ML; MG/100ML; MG/100ML
INJECTION, SOLUTION INTRAVENOUS CONTINUOUS
Status: CANCELLED | OUTPATIENT
Start: 2017-10-03

## 2017-10-04 ENCOUNTER — ANESTHESIA (OUTPATIENT)
Dept: OBSTETRICS AND GYNECOLOGY | Facility: OTHER | Age: 37
End: 2017-10-04
Payer: COMMERCIAL

## 2017-10-04 ENCOUNTER — ANESTHESIA EVENT (OUTPATIENT)
Dept: OBSTETRICS AND GYNECOLOGY | Facility: OTHER | Age: 37
End: 2017-10-04
Payer: COMMERCIAL

## 2017-10-04 ENCOUNTER — HOSPITAL ENCOUNTER (INPATIENT)
Facility: OTHER | Age: 37
LOS: 3 days | Discharge: HOME | End: 2017-10-07
Attending: OBSTETRICS & GYNECOLOGY | Admitting: OBSTETRICS & GYNECOLOGY
Payer: COMMERCIAL

## 2017-10-04 ENCOUNTER — SURGERY (OUTPATIENT)
Age: 37
End: 2017-10-04

## 2017-10-04 DIAGNOSIS — O16.3 HYPERTENSION AFFECTING PREGNANCY, THIRD TRIMESTER: ICD-10-CM

## 2017-10-04 DIAGNOSIS — O34.219 PREVIOUS CESAREAN DELIVERY AFFECTING PREGNANCY: ICD-10-CM

## 2017-10-04 LAB
ABO + RH BLD: NORMAL
BASOPHILS # BLD AUTO: 0.01 K/UL
BASOPHILS NFR BLD: 0.2 %
BLD GP AB SCN CELLS X3 SERPL QL: NORMAL
DIFFERENTIAL METHOD: ABNORMAL
EOSINOPHIL # BLD AUTO: 0.1 K/UL
EOSINOPHIL NFR BLD: 2.1 %
ERYTHROCYTE [DISTWIDTH] IN BLOOD BY AUTOMATED COUNT: 13.4 %
HCT VFR BLD AUTO: 33.4 %
HGB BLD-MCNC: 11.3 G/DL
LYMPHOCYTES # BLD AUTO: 1.3 K/UL
LYMPHOCYTES NFR BLD: 26.3 %
MCH RBC QN AUTO: 29.9 PG
MCHC RBC AUTO-ENTMCNC: 33.8 G/DL
MCV RBC AUTO: 88 FL
MONOCYTES # BLD AUTO: 0.5 K/UL
MONOCYTES NFR BLD: 11.1 %
NEUTROPHILS # BLD AUTO: 2.9 K/UL
NEUTROPHILS NFR BLD: 59.9 %
PLATELET # BLD AUTO: 199 K/UL
PMV BLD AUTO: 10.6 FL
RBC # BLD AUTO: 3.78 M/UL
WBC # BLD AUTO: 4.86 K/UL

## 2017-10-04 PROCEDURE — 88304 TISSUE EXAM BY PATHOLOGIST: CPT | Mod: 26,,, | Performed by: PATHOLOGY

## 2017-10-04 PROCEDURE — 37000008 HC ANESTHESIA 1ST 15 MINUTES: Performed by: OBSTETRICS & GYNECOLOGY

## 2017-10-04 PROCEDURE — 11000001 HC ACUTE MED/SURG PRIVATE ROOM

## 2017-10-04 PROCEDURE — 59514 CESAREAN DELIVERY ONLY: CPT | Mod: 82,AT,, | Performed by: OBSTETRICS & GYNECOLOGY

## 2017-10-04 PROCEDURE — 59510 CESAREAN DELIVERY: CPT | Mod: ,,, | Performed by: ANESTHESIOLOGY

## 2017-10-04 PROCEDURE — 36000680 HC C/S TUBAL LIGATION LEVEL I

## 2017-10-04 PROCEDURE — 25000003 PHARM REV CODE 250: Performed by: OBSTETRICS & GYNECOLOGY

## 2017-10-04 PROCEDURE — 63600175 PHARM REV CODE 636 W HCPCS: Performed by: STUDENT IN AN ORGANIZED HEALTH CARE EDUCATION/TRAINING PROGRAM

## 2017-10-04 PROCEDURE — 86900 BLOOD TYPING SEROLOGIC ABO: CPT

## 2017-10-04 PROCEDURE — 37000009 HC ANESTHESIA EA ADD 15 MINS: Performed by: OBSTETRICS & GYNECOLOGY

## 2017-10-04 PROCEDURE — 59510 CESAREAN DELIVERY: CPT | Mod: AT,,, | Performed by: OBSTETRICS & GYNECOLOGY

## 2017-10-04 PROCEDURE — 85025 COMPLETE CBC W/AUTO DIFF WBC: CPT

## 2017-10-04 PROCEDURE — 0UB70ZZ EXCISION OF BILATERAL FALLOPIAN TUBES, OPEN APPROACH: ICD-10-PCS | Performed by: OBSTETRICS & GYNECOLOGY

## 2017-10-04 PROCEDURE — 86703 HIV-1/HIV-2 1 RESULT ANTBDY: CPT

## 2017-10-04 PROCEDURE — 25000003 PHARM REV CODE 250: Performed by: STUDENT IN AN ORGANIZED HEALTH CARE EDUCATION/TRAINING PROGRAM

## 2017-10-04 PROCEDURE — 88304 TISSUE EXAM BY PATHOLOGIST: CPT | Performed by: PATHOLOGY

## 2017-10-04 PROCEDURE — 51702 INSERT TEMP BLADDER CATH: CPT

## 2017-10-04 PROCEDURE — 86850 RBC ANTIBODY SCREEN: CPT

## 2017-10-04 PROCEDURE — S0028 INJECTION, FAMOTIDINE, 20 MG: HCPCS | Performed by: OBSTETRICS & GYNECOLOGY

## 2017-10-04 PROCEDURE — 86592 SYPHILIS TEST NON-TREP QUAL: CPT

## 2017-10-04 PROCEDURE — 58611 LIGATE OVIDUCT(S) ADD-ON: CPT | Mod: 82,,, | Performed by: OBSTETRICS & GYNECOLOGY

## 2017-10-04 PROCEDURE — 58611 LIGATE OVIDUCT(S) ADD-ON: CPT | Mod: ,,, | Performed by: OBSTETRICS & GYNECOLOGY

## 2017-10-04 PROCEDURE — 63600175 PHARM REV CODE 636 W HCPCS: Performed by: OBSTETRICS & GYNECOLOGY

## 2017-10-04 RX ORDER — ACETAMINOPHEN 10 MG/ML
INJECTION, SOLUTION INTRAVENOUS
Status: DISCONTINUED | OUTPATIENT
Start: 2017-10-04 | End: 2017-10-04

## 2017-10-04 RX ORDER — OXYTOCIN/RINGER'S LACTATE 20/1000 ML
41.65 PLASTIC BAG, INJECTION (ML) INTRAVENOUS CONTINUOUS
Status: DISPENSED | OUTPATIENT
Start: 2017-10-04 | End: 2017-10-04

## 2017-10-04 RX ORDER — OXYTOCIN 10 [USP'U]/ML
INJECTION, SOLUTION INTRAMUSCULAR; INTRAVENOUS
Status: DISCONTINUED | OUTPATIENT
Start: 2017-10-04 | End: 2017-10-04

## 2017-10-04 RX ORDER — SIMETHICONE 80 MG
1 TABLET,CHEWABLE ORAL EVERY 6 HOURS PRN
Status: DISCONTINUED | OUTPATIENT
Start: 2017-10-04 | End: 2017-10-07 | Stop reason: HOSPADM

## 2017-10-04 RX ORDER — FAMOTIDINE 10 MG/ML
20 INJECTION INTRAVENOUS
Status: DISCONTINUED | OUTPATIENT
Start: 2017-10-04 | End: 2017-10-07 | Stop reason: HOSPADM

## 2017-10-04 RX ORDER — ONDANSETRON 8 MG/1
8 TABLET, ORALLY DISINTEGRATING ORAL EVERY 8 HOURS PRN
Status: DISCONTINUED | OUTPATIENT
Start: 2017-10-04 | End: 2017-10-07 | Stop reason: HOSPADM

## 2017-10-04 RX ORDER — MISOPROSTOL 200 UG/1
TABLET ORAL
Status: DISCONTINUED
Start: 2017-10-04 | End: 2017-10-04 | Stop reason: WASHOUT

## 2017-10-04 RX ORDER — DOCUSATE SODIUM 100 MG/1
200 CAPSULE, LIQUID FILLED ORAL 2 TIMES DAILY
Status: DISCONTINUED | OUTPATIENT
Start: 2017-10-04 | End: 2017-10-07 | Stop reason: HOSPADM

## 2017-10-04 RX ORDER — CARBOPROST TROMETHAMINE 250 UG/ML
INJECTION, SOLUTION INTRAMUSCULAR
Status: DISCONTINUED
Start: 2017-10-04 | End: 2017-10-04 | Stop reason: WASHOUT

## 2017-10-04 RX ORDER — IBUPROFEN 600 MG/1
600 TABLET ORAL EVERY 6 HOURS
Status: DISCONTINUED | OUTPATIENT
Start: 2017-10-05 | End: 2017-10-07 | Stop reason: HOSPADM

## 2017-10-04 RX ORDER — SODIUM CHLORIDE, SODIUM LACTATE, POTASSIUM CHLORIDE, CALCIUM CHLORIDE 600; 310; 30; 20 MG/100ML; MG/100ML; MG/100ML; MG/100ML
INJECTION, SOLUTION INTRAVENOUS CONTINUOUS
Status: ACTIVE | OUTPATIENT
Start: 2017-10-04 | End: 2017-10-04

## 2017-10-04 RX ORDER — SODIUM CHLORIDE, SODIUM LACTATE, POTASSIUM CHLORIDE, CALCIUM CHLORIDE 600; 310; 30; 20 MG/100ML; MG/100ML; MG/100ML; MG/100ML
INJECTION, SOLUTION INTRAVENOUS CONTINUOUS
Status: DISCONTINUED | OUTPATIENT
Start: 2017-10-04 | End: 2017-10-04

## 2017-10-04 RX ORDER — OXYCODONE AND ACETAMINOPHEN 5; 325 MG/1; MG/1
1 TABLET ORAL EVERY 4 HOURS PRN
Status: DISCONTINUED | OUTPATIENT
Start: 2017-10-05 | End: 2017-10-07 | Stop reason: HOSPADM

## 2017-10-04 RX ORDER — ONDANSETRON HYDROCHLORIDE 2 MG/ML
INJECTION, SOLUTION INTRAMUSCULAR; INTRAVENOUS
Status: DISCONTINUED | OUTPATIENT
Start: 2017-10-04 | End: 2017-10-04

## 2017-10-04 RX ORDER — HYDROMORPHONE HYDROCHLORIDE 2 MG/ML
INJECTION, SOLUTION INTRAMUSCULAR; INTRAVENOUS; SUBCUTANEOUS
Status: DISCONTINUED | OUTPATIENT
Start: 2017-10-04 | End: 2017-10-04

## 2017-10-04 RX ORDER — ONDANSETRON 2 MG/ML
4 INJECTION INTRAMUSCULAR; INTRAVENOUS EVERY 8 HOURS PRN
Status: ACTIVE | OUTPATIENT
Start: 2017-10-04 | End: 2017-10-05

## 2017-10-04 RX ORDER — DIPHENHYDRAMINE HCL 25 MG
25 CAPSULE ORAL EVERY 4 HOURS PRN
Status: DISCONTINUED | OUTPATIENT
Start: 2017-10-04 | End: 2017-10-07 | Stop reason: HOSPADM

## 2017-10-04 RX ORDER — KETOROLAC TROMETHAMINE 30 MG/ML
30 INJECTION, SOLUTION INTRAMUSCULAR; INTRAVENOUS EVERY 8 HOURS
Status: COMPLETED | OUTPATIENT
Start: 2017-10-04 | End: 2017-10-05

## 2017-10-04 RX ORDER — SODIUM CITRATE AND CITRIC ACID MONOHYDRATE 334; 500 MG/5ML; MG/5ML
30 SOLUTION ORAL
Status: DISCONTINUED | OUTPATIENT
Start: 2017-10-04 | End: 2017-10-04

## 2017-10-04 RX ORDER — BISACODYL 10 MG
10 SUPPOSITORY, RECTAL RECTAL ONCE AS NEEDED
Status: ACTIVE | OUTPATIENT
Start: 2017-10-04 | End: 2017-10-04

## 2017-10-04 RX ORDER — NIFEDIPINE 30 MG/1
30 TABLET, EXTENDED RELEASE ORAL DAILY
Status: DISCONTINUED | OUTPATIENT
Start: 2017-10-04 | End: 2017-10-06

## 2017-10-04 RX ORDER — BUPIVACAINE HYDROCHLORIDE 7.5 MG/ML
INJECTION, SOLUTION INTRASPINAL
Status: DISCONTINUED | OUTPATIENT
Start: 2017-10-04 | End: 2017-10-04

## 2017-10-04 RX ORDER — AMOXICILLIN 250 MG
1 CAPSULE ORAL NIGHTLY PRN
Status: DISCONTINUED | OUTPATIENT
Start: 2017-10-04 | End: 2017-10-07 | Stop reason: HOSPADM

## 2017-10-04 RX ORDER — OXYCODONE HYDROCHLORIDE 5 MG/1
10 TABLET ORAL EVERY 4 HOURS PRN
Status: DISPENSED | OUTPATIENT
Start: 2017-10-04 | End: 2017-10-05

## 2017-10-04 RX ORDER — OXYTOCIN 10 [USP'U]/ML
INJECTION, SOLUTION INTRAMUSCULAR; INTRAVENOUS
Status: DISCONTINUED
Start: 2017-10-04 | End: 2017-10-04 | Stop reason: WASHOUT

## 2017-10-04 RX ORDER — FENTANYL CITRATE 50 UG/ML
INJECTION, SOLUTION INTRAMUSCULAR; INTRAVENOUS
Status: DISCONTINUED | OUTPATIENT
Start: 2017-10-04 | End: 2017-10-04

## 2017-10-04 RX ORDER — OXYCODONE AND ACETAMINOPHEN 10; 325 MG/1; MG/1
1 TABLET ORAL EVERY 4 HOURS PRN
Status: DISCONTINUED | OUTPATIENT
Start: 2017-10-05 | End: 2017-10-07 | Stop reason: HOSPADM

## 2017-10-04 RX ORDER — HYDROCORTISONE 25 MG/G
CREAM TOPICAL 3 TIMES DAILY PRN
Status: DISCONTINUED | OUTPATIENT
Start: 2017-10-04 | End: 2017-10-07 | Stop reason: HOSPADM

## 2017-10-04 RX ORDER — ADHESIVE BANDAGE
30 BANDAGE TOPICAL 2 TIMES DAILY PRN
Status: DISCONTINUED | OUTPATIENT
Start: 2017-10-05 | End: 2017-10-07 | Stop reason: HOSPADM

## 2017-10-04 RX ORDER — PHENYLEPHRINE HYDROCHLORIDE 10 MG/ML
INJECTION INTRAVENOUS
Status: DISCONTINUED | OUTPATIENT
Start: 2017-10-04 | End: 2017-10-04

## 2017-10-04 RX ORDER — OXYCODONE HYDROCHLORIDE 5 MG/1
5 TABLET ORAL EVERY 4 HOURS PRN
Status: DISPENSED | OUTPATIENT
Start: 2017-10-04 | End: 2017-10-05

## 2017-10-04 RX ORDER — ACETAMINOPHEN 325 MG/1
650 TABLET ORAL EVERY 6 HOURS
Status: COMPLETED | OUTPATIENT
Start: 2017-10-04 | End: 2017-10-05

## 2017-10-04 RX ADMIN — OXYCODONE HYDROCHLORIDE 5 MG: 5 TABLET ORAL at 01:10

## 2017-10-04 RX ADMIN — SODIUM CITRATE AND CITRIC ACID MONOHYDRATE 30 ML: 500; 334 SOLUTION ORAL at 08:10

## 2017-10-04 RX ADMIN — ONDANSETRON 4 MG: 2 INJECTION, SOLUTION INTRAMUSCULAR; INTRAVENOUS at 09:10

## 2017-10-04 RX ADMIN — Medication 41.65 MILLI-UNITS/MIN: at 11:10

## 2017-10-04 RX ADMIN — ACETAMINOPHEN 650 MG: 325 TABLET ORAL at 05:10

## 2017-10-04 RX ADMIN — OXYTOCIN 2 UNITS: 10 INJECTION, SOLUTION INTRAMUSCULAR; INTRAVENOUS at 09:10

## 2017-10-04 RX ADMIN — SODIUM CHLORIDE, SODIUM LACTATE, POTASSIUM CHLORIDE, AND CALCIUM CHLORIDE: 600; 310; 30; 20 INJECTION, SOLUTION INTRAVENOUS at 08:10

## 2017-10-04 RX ADMIN — NIFEDIPINE 30 MG: 30 TABLET, FILM COATED, EXTENDED RELEASE ORAL at 11:10

## 2017-10-04 RX ADMIN — SODIUM CHLORIDE, SODIUM LACTATE, POTASSIUM CHLORIDE, AND CALCIUM CHLORIDE: 600; 310; 30; 20 INJECTION, SOLUTION INTRAVENOUS at 07:10

## 2017-10-04 RX ADMIN — HYDROMORPHONE HYDROCHLORIDE 0.75 MCG: 2 INJECTION, SOLUTION INTRAMUSCULAR; INTRAVENOUS; SUBCUTANEOUS at 09:10

## 2017-10-04 RX ADMIN — BUPIVACAINE HYDROCHLORIDE IN DEXTROSE 1.5 ML: 7.5 INJECTION, SOLUTION SUBARACHNOID at 09:10

## 2017-10-04 RX ADMIN — FENTANYL CITRATE 10 MCG: 50 INJECTION, SOLUTION INTRAMUSCULAR; INTRAVENOUS at 09:10

## 2017-10-04 RX ADMIN — PHENYLEPHRINE HYDROCHLORIDE 100 MCG: 10 INJECTION INTRAVENOUS at 09:10

## 2017-10-04 RX ADMIN — OXYCODONE HYDROCHLORIDE 5 MG: 5 TABLET ORAL at 05:10

## 2017-10-04 RX ADMIN — PHENYLEPHRINE HYDROCHLORIDE 50 MCG: 10 INJECTION INTRAVENOUS at 09:10

## 2017-10-04 RX ADMIN — PHENYLEPHRINE HYDROCHLORIDE 150 MCG: 10 INJECTION INTRAVENOUS at 09:10

## 2017-10-04 RX ADMIN — KETOROLAC TROMETHAMINE 30 MG: 30 INJECTION, SOLUTION INTRAMUSCULAR at 11:10

## 2017-10-04 RX ADMIN — FAMOTIDINE 20 MG: 10 INJECTION, SOLUTION INTRAVENOUS at 08:10

## 2017-10-04 RX ADMIN — ACETAMINOPHEN 1000 MG: 10 INJECTION, SOLUTION INTRAVENOUS at 09:10

## 2017-10-04 RX ADMIN — DEXTROSE 2 G: 50 INJECTION, SOLUTION INTRAVENOUS at 09:10

## 2017-10-04 RX ADMIN — KETOROLAC TROMETHAMINE 30 MG: 30 INJECTION, SOLUTION INTRAMUSCULAR at 03:10

## 2017-10-04 RX ADMIN — DOCUSATE SODIUM 200 MG: 100 CAPSULE, LIQUID FILLED ORAL at 08:10

## 2017-10-04 RX ADMIN — SODIUM CHLORIDE, SODIUM LACTATE, POTASSIUM CHLORIDE, AND CALCIUM CHLORIDE 1000 ML: 600; 310; 30; 20 INJECTION, SOLUTION INTRAVENOUS at 06:10

## 2017-10-04 RX ADMIN — PHENYLEPHRINE HYDROCHLORIDE 200 MCG: 10 INJECTION INTRAVENOUS at 09:10

## 2017-10-04 RX ADMIN — ACETAMINOPHEN 650 MG: 325 TABLET ORAL at 11:10

## 2017-10-04 RX ADMIN — OXYCODONE HYDROCHLORIDE 5 MG: 5 TABLET ORAL at 09:10

## 2017-10-04 NOTE — TRANSFER OF CARE
"Anesthesia Transfer of Care Note    Patient: Vika Donis    Procedure(s) Performed: Procedure(s) (LRB):  DELIVERY- SECTION (N/A)  DELIVERY- SECTION WITH TUBAL (N/A)    Patient location: Labor and Delivery    Anesthesia Type: CSE    Transport from OR: Transported from OR on room air with adequate spontaneous ventilation    Post pain: adequate analgesia    Post assessment: no apparent anesthetic complications    Post vital signs: stable    Level of consciousness: awake    Nausea/Vomiting: no nausea/vomiting    Complications: none    Transfer of care protocol was followed      Last vitals:   Visit Vitals  BP (!) 150/87   Pulse 98   Temp 36.4 °C (97.6 °F) (Oral)   Resp 18   Ht 5' 6" (1.676 m)   Wt 104.4 kg (230 lb 2.6 oz)   LMP 2017   SpO2 98%   Breastfeeding? No   BMI 37.15 kg/m²     "

## 2017-10-04 NOTE — LACTATION NOTE
Basic lactation education given. Questions answered. Pt has lc number on whiteboard to call for breastfeeding assistance.

## 2017-10-04 NOTE — ANESTHESIA PREPROCEDURE EVALUATION
10/04/2017  Vika Donis is a 37 y.o. female  at 38 w with a significant PMH for HTN, and scoliosis as a child who presents to L&D for scheduled  secondary to GHTN and breech position. Pt had a  with her previous child 2/2 to HTN with no issues. She denies bleeding diatheses and does endorse being told as a child that she had scoliosis. NPO since 11 pm last night.     OB History    Para Term  AB Living   7 2   2 4 2   SAB TAB Ectopic Multiple Live Births   2 2     2      # Outcome Date GA Lbr Drake/2nd Weight Sex Delivery Anes PTL Lv   7 Current            6  13 36w0d  2.977 kg (6 lb 9 oz) M CS-Unspec Spinal  RASHAD      Complications: Preeclampsia   5 TAB  5w0d       DEC   4  07 36w0d  2.126 kg (4 lb 11 oz) F CS-Unspec Spinal  RASHAD      Complications: Preeclampsia   3 SAB  5w0d       FD   2 SAB  11w0d       FD   1 TAB  8w0d       DEC          Wt Readings from Last 1 Encounters:   10/04/17 0656 104.4 kg (230 lb 2.6 oz)       BP Readings from Last 3 Encounters:   10/04/17 (!) 150/87   10/02/17 130/84   17 (!) 146/86       Patient Active Problem List   Diagnosis    Elderly multigravida in third trimester    Pre-existing essential hypertension during pregnancy in third trimester    Hypertension affecting pregnancy, third trimester       Past Surgical History:   Procedure Laterality Date     SECTION      x2       Social History     Social History    Marital status:      Spouse name: N/A    Number of children: N/A    Years of education: N/A     Occupational History    Not on file.     Social History Main Topics    Smoking status: Never Smoker    Smokeless tobacco: Never Used    Alcohol use Yes      Comment: once a week     Drug use: No    Sexual activity: Yes     Partners: Male     Birth control/  protection: Pill      Comment: : Damien     Other Topics Concern    Not on file     Social History Narrative    No narrative on file         Chemistry        Component Value Date/Time     09/28/2017 1645    K 3.9 09/28/2017 1645     09/28/2017 1645    CO2 23 09/28/2017 1645    BUN 7 09/28/2017 1645    CREATININE 0.7 09/28/2017 1645    GLU 80 09/28/2017 1645        Component Value Date/Time    CALCIUM 9.7 09/28/2017 1645    ALKPHOS 123 09/28/2017 1645    AST 28 09/28/2017 1645    ALT 18 09/28/2017 1645    BILITOT 0.3 09/28/2017 1645    ESTGFRAFRICA >60.0 09/28/2017 1645    EGFRNONAA >60.0 09/28/2017 1645            Lab Results   Component Value Date    WBC 4.86 10/04/2017    HGB 11.3 (L) 10/04/2017    HCT 33.4 (L) 10/04/2017    MCV 88 10/04/2017     10/04/2017       No results for input(s): INR, PROTIME, APTT in the last 72 hours.    Invalid input(s): PT                Anesthesia Evaluation    I have reviewed the Patient Summary Reports.        Review of Systems  Anesthesia Hx:  History of prior surgery of interest to airway management or planning: Denies Family Hx of Anesthesia complications.   Denies Personal Hx of Anesthesia complications.   Social:  Non-Smoker    Hematology/Oncology:  Hematology Normal   Oncology Normal     EENT/Dental:EENT/Dental Normal   Cardiovascular:   Hypertension Denies MI.          Pulmonary:   Denies COPD.  Denies Asthma.    Renal/:  Renal/ Normal     Hepatic/GI:  Hepatic/GI Normal    Endocrine:   Denies Diabetes.        Physical Exam  General:  Well nourished    Airway/Jaw/Neck:  Airway Findings: Mouth Opening: Normal Tongue: Normal  General Airway Assessment: Adult  Mallampati: II  TM Distance: 4 - 6 cm         Dental:  Dental Findings: In tact   Chest/Lungs:  Chest/Lungs Clear    Heart/Vascular:  Heart Findings: Normal Heart murmur: negative       Mental Status:  Mental Status Findings: Normal        Anesthesia Plan  Type of Anesthesia, risks &  benefits discussed:  Anesthesia Type:  epidural, general, spinal  Patient's Preference:   Intra-op Monitoring Plan: standard ASA monitors  Intra-op Monitoring Plan Comments:   Post Op Pain Control Plan: epidural analgesia and intrathecal opioid  Post Op Pain Control Plan Comments:   Induction:   IV  Beta Blocker:  Patient is not currently on a Beta-Blocker (No further documentation required).       Informed Consent: Patient understands risks and agrees with Anesthesia plan.  Questions answered. Anesthesia consent signed with patient.  ASA Score: 2     Day of Surgery Review of History & Physical: I have interviewed and examined the patient. I have reviewed the patient's H&P dated:    H&P update referred to the provider.         Ready For Surgery From Anesthesia Perspective.

## 2017-10-04 NOTE — OPERATIVE NOTE ADDENDUM
Surgery Date:  10/04/2017      Participating Surgeons:  Surgeon(s) and Role:     * Asher Gannon MD - Primary    Procedures:  Procedure(s) (LRB):  DELIVERY- SECTION (N/A)  DELIVERY- SECTION WITH TUBAL (N/A)    Assistant Surgeon's Certification of Necessity:  I understand that section 1842 (b) (6) (d) of the Social Security Act generally prohibits Medicare Part B reasonable charge payment for the services of assistants at surgery in teaching hospitals when qualified residents are available to furnish such services. I certify that the services for which payment is claimed were medically necessary, and that no qualified resident was available to perform the services. I further understand that these services are subject to post-payment review by the Medicare carrier.      Angie Anthony MD    10/04/2017  10:56 AM

## 2017-10-04 NOTE — DISCHARGE INSTRUCTIONS
Breastfeeding Discharge Instructions       Feed the baby at the earliest sign of hunger or comfort  o Hands to mouth, sucking motions  o Rooting or searching for something to suck on  o Dont wait for crying - it is a sign of distress     The feedings may be 8-12 times per 24hrs and will not follow a schedule   Avoid pacifiers and bottles for the first 4 weeks   Alternate the breast you start the feeding with, or start with the breast that feels the fullest   Switch breasts when the baby takes himself off the breast or falls asleep   Keep offering breasts until the baby looks full, no longer gives hunger signs, and stays asleep when placed on his back in the crib   If the baby is sleepy and wont wake for a feeding, put the baby skin-to-skin dressed in a diaper against the mothers bare chest   Sleep near your baby   The baby should be positioned and latched on to the breast correctly  o Chest-to-chest, chin in the breast  o Babys lips are flipped outward  o Babys mouth is stretched open wide like a shout  o Babys sucking should feel like tugging to the mother  - The baby should be drinking at the breast:  o You should hear swallowing or gulping throughout the feeding  o You should see milk on the babys lips when he comes off the breast  o Your breasts should be softer when the baby is finished feeding  o The baby should look relaxed at the end of feedings  o After the 4th day and your milk is in:  o The babys poop should turn bright yellow and be loose, watery, and seedy  o The baby should have at least 3-4 poops the size of the palm of your hand per day  o The baby should have at least 5-6 wet diapers per day  o The urine should be light yellow in color  You should drink when you are thirsty and eat a healthy diet when you are    hungry.     Take naps to get the rest you need.   Take medications and/or drink alcohol only with permission of your obstetrician    or the babys pediatrician.  You can  also call the [facility]ant Risk [facility],   ([phone]), Monday-Friday, 8am-5pm Central time, to get the most   up-to-date evidence-based information on the use of medications during   pregnancy and breastfeeding.      The baby should be examined by a pediatrician at 3-5 days of age.  Once your   milk comes in, the baby should be gaining at least ½ - 1oz each day and should be back to birthweight no later than 10-14 days of age.          Community Resources    [facility] Breastfeeding Warmline: [phone]   Local [facility] clinics: provide incentives and breastpumps to eligible mothers  [employer] ([employer]):  mother-to-mother support group website        www.[employer]  Local [employer] mother-to-mother support groups:        www.[employer]        [employer] [facility]   Dr. Bassem Smith website for latch videos and general information:        www.breastfeedinginc.ca  [facility] is a call center that provides information about the safety of taking medications while breastfeeding.  Call 1-784.338.8771, M-F, 8am-5pm, [place].  International Lactation Consultant Association provides resources for assistance:        www.ilca.org  [facility] Breastfeeding Coalition provides informationand resources for parents  and the community    http://[employer] Foundationastfeeding.org     [employer] Weller is a mom-to-mom support group:                             www.[employer].com//breastfeedng-support/  [employer] for Healthy Babies:  0-054-427-BABY(0661)  [employer]: a breastfeeding support group for women of color, [phone]

## 2017-10-04 NOTE — H&P
HISTORY AND PHYSICAL                                                OBSTETRICS          Subjective:       Vika Donis is a 37 y.o.  female with IUP at 38w0d weeks gestation who presents to L&D for CHTN with worsening hypertension this past month and breech presentation.   . Pertinent medical history for this pregnancy include CHTN, prev LTCS x 2,  Patient denies contractions, denies vaginal bleeding, denies LOF.   Fetal Movement: normal.     PMHx:   Past Medical History:   Diagnosis Date    Abnormal Pap smear of cervix      HPV+    History of multiple miscarriages     HPV (human papilloma virus) infection     Hypertension        PSHx:   Past Surgical History:   Procedure Laterality Date     SECTION      x2       All: Review of patient's allergies indicates:  No Known Allergies    Meds:   (Not in a hospital admission)    SH:   Social History     Social History    Marital status:      Spouse name: N/A    Number of children: N/A    Years of education: N/A     Occupational History    Not on file.     Social History Main Topics    Smoking status: Never Smoker    Smokeless tobacco: Never Used    Alcohol use Yes      Comment: once a week     Drug use: No    Sexual activity: Yes     Partners: Male     Birth control/ protection: Pill      Comment: : [employer]     Other Topics Concern    Not on file     Social History Narrative    No narrative on file       FH:   Family History   Problem Relation Age of Onset    Diabetes Paternal Grandmother     Hypertension Mother     Breast cancer Maternal Aunt     Cancer Maternal Aunt     Colon cancer Neg Hx     Ovarian cancer Neg Hx        OBHx:   Obstetric History       T0      L2     SAB2   TAB0   Ectopic0   Multiple0   Live Births2       # Outcome Date GA Lbr Drake/2nd Weight Sex Delivery Anes PTL Lv   7 Current            6  13 36w0d  2.977 kg (6 lb 9 oz) M CS-Unspec Spinal  RASHAD      Name: Phi  Coreon      Complications: Preeclampsia   5 TAB 2011w0d       DEC   4  07 36w0d  2.126 kg (4 lb 11 oz) F CS-Unspec Spinal  RASHAD      Name: Evy      Complications: Preeclampsia   3 SAB  5w0d       FD   2 SAB  11w0d       FD   1 TAB 2003wd       DEC          Objective:       BP (!) 140/90   Wt 104 kg (229 lb 4.5 oz)   LMP 2017   BMI 38.15 kg/m²     Vitals:    17 1008   BP: (!) 140/90   Weight: 104 kg (229 lb 4.5 oz)       General:   alert and cooperative   Lungs:   clear to auscultation bilaterally   Heart:   regular rate and rhythm, S1, S2 normal, no murmur, click, rub or gallop   Abdomen:  soft, non-tender; bowel sounds normal; no masses,  no organomegaly   Extremities negative edema, negative erythema   FHT: 130's Cat 1 (reassuring)                 TOCO: Irregular contractions       Cervix:     Dilation: 0    Effacement: Long    Station:  -3    Consistency: soft    Position: anterior        Assessment:       37w6d weeks gestation.  Prev LTCS x2   Breech presentation  CHTH on procardia 30XL with worsening HTN      There are no hospital problems to display for this patient.         Plan:      Risks, benefits, alternatives and possible complications have been discussed in detail with the patient.   - Consents signed in clinic  - Admit to Labor and Delivery unit  - For repeat LTCS  Pt desires abdominal binder after delivery

## 2017-10-04 NOTE — LACTATION NOTE
10/04/17 1615   Maternal Infant Feeding   Time Spent (min) 0-15 min   Breastfeeding History   Currently Breastfeeding yes   Lactation Interventions   Maternal Breastfeeding Support lactation counseling provided

## 2017-10-04 NOTE — INTERVAL H&P NOTE
The patient has been examined and the H&P has been reviewed:    I concur with the findings and no changes have occurred since H&P was written.    Anesthesia/Surgery risks, benefits and alternative options discussed and understood by patient/family.          Active Hospital Problems    Diagnosis  POA    Hypertension affecting pregnancy, third trimester [O16.3]  Yes      Resolved Hospital Problems    Diagnosis Date Resolved POA   No resolved problems to display.

## 2017-10-05 PROBLEM — O16.3 HYPERTENSION AFFECTING PREGNANCY, THIRD TRIMESTER: Status: RESOLVED | Noted: 2017-10-04 | Resolved: 2017-10-05

## 2017-10-05 LAB
ALBUMIN SERPL BCP-MCNC: 2.5 G/DL
ALP SERPL-CCNC: 112 U/L
ALT SERPL W/O P-5'-P-CCNC: 16 U/L
ANION GAP SERPL CALC-SCNC: 9 MMOL/L
AST SERPL-CCNC: 29 U/L
BASOPHILS # BLD AUTO: 0.01 K/UL
BASOPHILS NFR BLD: 0.2 %
BILIRUB SERPL-MCNC: 0.6 MG/DL
BUN SERPL-MCNC: 5 MG/DL
CALCIUM SERPL-MCNC: 9.2 MG/DL
CHLORIDE SERPL-SCNC: 106 MMOL/L
CO2 SERPL-SCNC: 21 MMOL/L
CREAT SERPL-MCNC: 0.7 MG/DL
DIFFERENTIAL METHOD: ABNORMAL
EOSINOPHIL # BLD AUTO: 0.1 K/UL
EOSINOPHIL NFR BLD: 1.3 %
ERYTHROCYTE [DISTWIDTH] IN BLOOD BY AUTOMATED COUNT: 13.5 %
EST. GFR  (AFRICAN AMERICAN): >60 ML/MIN/1.73 M^2
EST. GFR  (NON AFRICAN AMERICAN): >60 ML/MIN/1.73 M^2
GLUCOSE SERPL-MCNC: 85 MG/DL
HCT VFR BLD AUTO: 31.8 %
HGB BLD-MCNC: 10.7 G/DL
HIV 1+2 AB+HIV1 P24 AG SERPL QL IA: NEGATIVE
LYMPHOCYTES # BLD AUTO: 1.2 K/UL
LYMPHOCYTES NFR BLD: 19.9 %
MCH RBC QN AUTO: 29.6 PG
MCHC RBC AUTO-ENTMCNC: 33.6 G/DL
MCV RBC AUTO: 88 FL
MONOCYTES # BLD AUTO: 0.6 K/UL
MONOCYTES NFR BLD: 9.6 %
NEUTROPHILS # BLD AUTO: 4.2 K/UL
NEUTROPHILS NFR BLD: 68.7 %
PLATELET # BLD AUTO: 188 K/UL
PMV BLD AUTO: 10.8 FL
POTASSIUM SERPL-SCNC: 3.9 MMOL/L
PROT SERPL-MCNC: 6.3 G/DL
RBC # BLD AUTO: 3.61 M/UL
SODIUM SERPL-SCNC: 136 MMOL/L
WBC # BLD AUTO: 6.07 K/UL

## 2017-10-05 PROCEDURE — 99024 POSTOP FOLLOW-UP VISIT: CPT | Mod: ,,, | Performed by: OBSTETRICS & GYNECOLOGY

## 2017-10-05 PROCEDURE — 63600175 PHARM REV CODE 636 W HCPCS: Performed by: STUDENT IN AN ORGANIZED HEALTH CARE EDUCATION/TRAINING PROGRAM

## 2017-10-05 PROCEDURE — 25000003 PHARM REV CODE 250: Performed by: STUDENT IN AN ORGANIZED HEALTH CARE EDUCATION/TRAINING PROGRAM

## 2017-10-05 PROCEDURE — 11000001 HC ACUTE MED/SURG PRIVATE ROOM

## 2017-10-05 PROCEDURE — 85025 COMPLETE CBC W/AUTO DIFF WBC: CPT

## 2017-10-05 PROCEDURE — 36415 COLL VENOUS BLD VENIPUNCTURE: CPT

## 2017-10-05 PROCEDURE — 25000003 PHARM REV CODE 250: Performed by: OBSTETRICS & GYNECOLOGY

## 2017-10-05 PROCEDURE — 80053 COMPREHEN METABOLIC PANEL: CPT

## 2017-10-05 RX ORDER — OXYCODONE AND ACETAMINOPHEN 5; 325 MG/1; MG/1
1 TABLET ORAL EVERY 4 HOURS PRN
Qty: 45 TABLET | Refills: 0 | Status: SHIPPED | OUTPATIENT
Start: 2017-10-05 | End: 2017-10-07 | Stop reason: HOSPADM

## 2017-10-05 RX ORDER — DOCUSATE SODIUM 100 MG/1
200 CAPSULE, LIQUID FILLED ORAL 2 TIMES DAILY
Refills: 0 | COMMUNITY
Start: 2017-10-05 | End: 2017-10-07 | Stop reason: HOSPADM

## 2017-10-05 RX ADMIN — KETOROLAC TROMETHAMINE 30 MG: 30 INJECTION, SOLUTION INTRAMUSCULAR at 09:10

## 2017-10-05 RX ADMIN — OXYCODONE HYDROCHLORIDE 5 MG: 5 TABLET ORAL at 09:10

## 2017-10-05 RX ADMIN — OXYCODONE HYDROCHLORIDE AND ACETAMINOPHEN 1 TABLET: 10; 325 TABLET ORAL at 05:10

## 2017-10-05 RX ADMIN — DOCUSATE SODIUM 200 MG: 100 CAPSULE, LIQUID FILLED ORAL at 08:10

## 2017-10-05 RX ADMIN — OXYCODONE HYDROCHLORIDE 5 MG: 5 TABLET ORAL at 02:10

## 2017-10-05 RX ADMIN — OXYCODONE HYDROCHLORIDE AND ACETAMINOPHEN 1 TABLET: 10; 325 TABLET ORAL at 10:10

## 2017-10-05 RX ADMIN — DOCUSATE SODIUM 200 MG: 100 CAPSULE, LIQUID FILLED ORAL at 09:10

## 2017-10-05 RX ADMIN — SIMETHICONE CHEW TAB 80 MG 80 MG: 80 TABLET ORAL at 09:10

## 2017-10-05 RX ADMIN — IBUPROFEN 600 MG: 600 TABLET, FILM COATED ORAL at 01:10

## 2017-10-05 RX ADMIN — NIFEDIPINE 30 MG: 30 TABLET, FILM COATED, EXTENDED RELEASE ORAL at 09:10

## 2017-10-05 RX ADMIN — IBUPROFEN 600 MG: 600 TABLET, FILM COATED ORAL at 08:10

## 2017-10-05 RX ADMIN — ACETAMINOPHEN 650 MG: 325 TABLET ORAL at 06:10

## 2017-10-05 RX ADMIN — ACETAMINOPHEN 650 MG: 325 TABLET ORAL at 01:10

## 2017-10-05 RX ADMIN — OXYCODONE HYDROCHLORIDE AND ACETAMINOPHEN 1 TABLET: 10; 325 TABLET ORAL at 01:10

## 2017-10-05 NOTE — PROGRESS NOTES
[facility]  Obstetrics  Postpartum Progress Note    Patient Name: Vika Donis  MRN: [id number]  Admission Date: 10/4/2017  Hospital Length of Stay: 1 days  Attending Physician: Asher Gannon MD  Primary Care Provider: Susan Yoo MD    Subjective:     Principal Problem: delivery delivered    Hospital course: 10/4: repeat  section with tubal ligation  POD#1: doing well, pain after coughing but controlled with pain meds    Interval History:     She is doing well this morning. She is tolerating a regular diet without nausea or vomiting. She is voiding spontaneously. She is ambulating. She has passed flatus, and has not a BM. Vaginal bleeding is mild. She denies fever or chills. Abdominal pain is mild and controlled with oral medications. She is breastfeeding. She desires circumcision for her male baby: yes.    Objective:     Vital Signs (Most Recent):  Temp: 98.9 °F (37.2 °C) (10/05/17 0727)  Pulse: 82 (10/05/17 0727)  Resp: 18 (10/05/17 0727)  BP: 136/88 (10/05/17 0727)  SpO2: 98 % (10/05/17 0452) Vital Signs (24h Range):  Temp:  [98.4 °F (36.9 °C)-99 °F (37.2 °C)] 98.9 °F (37.2 °C)  Pulse:  [62-96] 82  Resp:  [16-20] 18  SpO2:  [94 %-100 %] 98 %  BP: (125-162)/(67-91) 136/88     Weight: 104.4 kg (230 lb 2.6 oz)  Body mass index is 37.15 kg/m².      Intake/Output Summary (Last 24 hours) at 10/05/17 1126  Last data filed at 10/05/17 0010   Gross per 24 hour   Intake                0 ml   Output             1000 ml   Net            -1000 ml       Significant Labs:  Lab Results   Component Value Date    GROUPTRH B POS 10/04/2017    HEPBSAG Negative 04/10/2017    STREPBCULT No Group B Streptococcus isolated 2017       Recent Labs  Lab 10/05/17  0506   HGB 10.7*   HCT 31.8*       I have personallly reviewed all pertinent lab results from the last 24 hours.    Physical Exam:   Constitutional: She is oriented to person, place, and time. She appears well-developed and well-nourished.  No distress.    HENT:   Head: Normocephalic and atraumatic.      Cardiovascular: Normal rate and regular rhythm.     Pulmonary/Chest: Effort normal. No respiratory distress.        Abdominal: Soft. Bowel sounds are normal. She exhibits no mass. There is no tenderness. There is no guarding.     Genitourinary: Uterus normal. No vaginal discharge found.   Genitourinary Comments: Firm fundus below umbilicus           Musculoskeletal: Normal range of motion and moves all extremeties.       Neurological: She is alert and oriented to person, place, and time.    Skin: Skin is warm. She is not diaphoretic.    Psychiatric: She has a normal mood and affect. Her behavior is normal. Judgment and thought content normal.       Assessment/Plan:     37 y.o. female  for:    *  delivery delivered    Routine post operative care            Disposition: As patient meets milestones, will plan to discharge POD#3/#4.    Krysta Hernandez MD  Obstetrics  [facility]

## 2017-10-05 NOTE — PROGRESS NOTES
Pt transferred from labor and delivery recovery room to MBU room 646 with , ID bands checked with  and identically matched, assessment completed, pt oriented to room/callbell/bed controls/nurse phone extension/infant feeding and  care/infant security band, education ongoing, pt denies ha, visual disturbance, or epigastric pain.

## 2017-10-05 NOTE — SUBJECTIVE & OBJECTIVE
Hospital course: 10/4: repeat  section with tubal ligation  POD#1: doing well, pain after coughing but controlled with pain meds    Interval History:     She is doing well this morning. She is tolerating a regular diet without nausea or vomiting. She is voiding spontaneously. She is ambulating. She has passed flatus, and has not a BM. Vaginal bleeding is mild. She denies fever or chills. Abdominal pain is mild and controlled with oral medications. She is breastfeeding. She desires circumcision for her male baby: yes.    Objective:     Vital Signs (Most Recent):  Temp: 98.9 °F (37.2 °C) (10/05/17 0727)  Pulse: 82 (10/05/17 0727)  Resp: 18 (10/05/17 0727)  BP: 136/88 (10/05/17 0727)  SpO2: 98 % (10/05/17 0452) Vital Signs (24h Range):  Temp:  [98.4 °F (36.9 °C)-99 °F (37.2 °C)] 98.9 °F (37.2 °C)  Pulse:  [62-96] 82  Resp:  [16-20] 18  SpO2:  [94 %-100 %] 98 %  BP: (125-162)/(67-91) 136/88     Weight: 104.4 kg (230 lb 2.6 oz)  Body mass index is 37.15 kg/m².      Intake/Output Summary (Last 24 hours) at 10/05/17 1126  Last data filed at 10/05/17 0010   Gross per 24 hour   Intake                0 ml   Output             1000 ml   Net            -1000 ml       Significant Labs:  Lab Results   Component Value Date    GROUPTRH B POS 10/04/2017    HEPBSAG Negative 04/10/2017    STREPBCULT No Group B Streptococcus isolated 2017       Recent Labs  Lab 10/05/17  0506   HGB 10.7*   HCT 31.8*       I have personallly reviewed all pertinent lab results from the last 24 hours.    Physical Exam:   Constitutional: She is oriented to person, place, and time. She appears well-developed and well-nourished. No distress.    HENT:   Head: Normocephalic and atraumatic.      Cardiovascular: Normal rate and regular rhythm.     Pulmonary/Chest: Effort normal. No respiratory distress.        Abdominal: Soft. Bowel sounds are normal. She exhibits no mass. There is no tenderness. There is no guarding.     Genitourinary: Uterus  normal. No vaginal discharge found.   Genitourinary Comments: Firm fundus below umbilicus           Musculoskeletal: Normal range of motion and moves all extremeties.       Neurological: She is alert and oriented to person, place, and time.    Skin: Skin is warm. She is not diaphoretic.    Psychiatric: She has a normal mood and affect. Her behavior is normal. Judgment and thought content normal.

## 2017-10-05 NOTE — PLAN OF CARE
Problem: Patient Care Overview  Goal: Plan of Care Review  Outcome: Ongoing (interventions implemented as appropriate)   Requested patient call lactation for latch assistance; patient will breastfeed baby on cue until content at least 8 times in 24 hours observing for signs of milk transfer; she will wake baby prn; she will avoid bottles, formula and pacifiers;

## 2017-10-05 NOTE — LACTATION NOTE
10/05/17 1817   Maternal Infant Feeding   Time Spent (min) 0-15 min   Breastfeeding Education adequate infant intake;importance of skin-to-skin contact   Lactation Interventions   Attachment Promotion counseling provided;role responsibility promoted;skin-to-skin contact encouraged   Breastfeeding Assistance feeding cue recognition promoted;support offered   Maternal Breastfeeding Support lactation counseling provided;maternal hydration promoted;maternal rest encouraged;maternal nutrition promoted   Requested patient call lactation for assistance with breastfeeding;

## 2017-10-05 NOTE — PLAN OF CARE
Problem: Patient Care Overview  Goal: Plan of Care Review  Outcome: Ongoing (interventions implemented as appropriate)  Pt tolerating PO well, no acute distress, ambulating and voiding without difficulty, bleeding moderate, fundus firm, pain well controlled on prescribed meds, bonding well with infant-- breast feeding.  Pt safety maintained.

## 2017-10-05 NOTE — ANESTHESIA POSTPROCEDURE EVALUATION
"Anesthesia Post Evaluation    Patient: Vika Donis    Procedure(s) Performed: Procedure(s) (LRB):  DELIVERY- SECTION WITH TUBAL (N/A)    Final Anesthesia Type: spinal  Patient location during evaluation: labor & delivery  Patient participation: Yes- Able to Participate  Level of consciousness: awake and alert and oriented  Post-procedure vital signs: reviewed and stable  Pain management: adequate  Airway patency: patent  PONV status at discharge: No PONV  Anesthetic complications: no      Cardiovascular status: blood pressure returned to baseline  Respiratory status: room air, unassisted and spontaneous ventilation  Hydration status: euvolemic  Follow-up not needed.        Visit Vitals  /71   Pulse 94   Temp 37.4 °C (99.4 °F) (Oral)   Resp 18   Ht 5' 6" (1.676 m)   Wt 104.4 kg (230 lb 2.6 oz)   LMP 2017   SpO2 98%   Breastfeeding? Unknown   BMI 37.15 kg/m²       Pain/Gianna Score: Pain Rating Prior to Med Admin: 5 (10/5/2017  1:53 PM)  Pain Rating Post Med Admin: 0 (10/5/2017 10:00 AM)      "

## 2017-10-05 NOTE — L&D DELIVERY NOTE
[facility]     Section     Operative Note      SUMMARY      Date of Procedure: 10/4/2017       Procedure: Procedure(s) (LRB):  DELIVERY- SECTION (N/A)  DELIVERY- SECTION WITH TUBAL (N/A)      Surgeon(s) and Role:     * Asher Gannon MD - Primary      Assisting Surgeon: Cassy Anthony MD      Pre-Operative Diagnosis:   IUP at 38 weeks  CHTN with worsening BP  Breech      Post-Operative Diagnosis: Post-Op Diagnosis Codes:     same      Anesthesia: Spinal/Epidural      Technical Procedures Used: Repeat LTCS with scar revision               Description of the Findings of the Procedure:          With patient in supine position, the legs are  and Carson Catheter placed and positioning to supine done.     Abdomen prepped with Chloroprep and 3 minute drying time allowed prior to draping of the abdomen.     Time out taken with OR team members.  NOTE: There was no qualified resident available at the time of surgery.    Procedure.  The patient was taken to the operating room awake and alert in stable condition.  After assuring informed consent the patient was taken to the operating room where spinal anesthesia was administered.  She was then prepped and draped in the usual sterile fashion in the dorsal supine position with a Carson catheter and SCDs in place.  A Pfannenstiel incision was then made with a scalpel and carried down to the underlying fascia.  The fascia was then nicked in the midline and extended bilaterally with the curved Chapin scissors.  The fascia was gently elevated up from the underlying rectus muscles.  The rectus muscles were then  in the midline and the peritoneum was identified and entered without difficulty with sharp dissection.  The peritoneum was then extended superiorly and inferiorly with good visuaProlization of the bladder.  A bladder blade was then inserted and the vesicouterine peritoneum was identified and a bladder flap was created with  sharp dissection.  A low transverse incision was made on the uterus and extended bilaterally manually.  The membranes were identified and ruptured.  Clear fluid was noted.      The male infant was delivered in  the AYAZ BREECH presentationwithout difficulty the mouth and nose were suctioned with the bulb suction.  The cord was clamped and cut.  And the infant was handed off to the waiting  team.  The infant had a loud vigorous cry at birth and Apgars were 8/9.  Cord blood was then obtained and the placenta was then removed manually.  The uterus was then exteriorized and the uterus was cleared of all clots and debris.  The uterine incision was closed with a 0 PDS in a running locking fashion.    At this point attention was turned to the patient's fallopian tubes where a bilateral salpingectomy was performed without difficulty.     Attention was then turned to the bilateral fallopian tubes. Patient confirmed that she did in fact want tubal ligation. I explained to the patient that I was performing a partial salpingectomy. The fallopian tubes were then individually elevated using the Christian clamp and the Bovie was used to cauterize the mesosalpinx. Two free ties of plain gut were placed on each end. A 5 cm segment of tube were removed on each side. Excellent hemostasis was seen.  Complications: No      Adequate hemostasis was noted along both fallopian tube stumps.  The uterus was then returned to the patient's abdomen.  A second look at the uterine incision was again noted to be hemostatic.  The peritoneum was then closed with a 2-0 Vicryl in a running fashion.  The fascia was closed with a #1 Vicryl in a running fashion.  Copious amounts of irrigation was performed and any small areas  of bleeding were cauterized with electrocautery.  The subcutaneous layer was closed with a 2-0 plain and the skin was closed with a 4-0 Monocryl in subcuticular stitch.  The patient tolerated the procedure well, sponge lap  and needle counts were correct ×2 and the patient was taken to the recovery area awake alert in stable condition.    Estimated Blood Loss (EBL): * No values recorded between 10/4/2017 12:00 AM and 10/4/2017 10:40 AM *               Implants: * No implants in log *      Specimens: Specimen (12h ago through future)    None          Condition: Good      Disposition: PACU - hemodynamically stable.      Attestation: Good  Delivery Information for  Gaurav Donis    Birth information:  YOB: 2015   Time of birth: 9:47 AM   Sex: male   Head Delivery Date/Time: 10/4/2017  9:47 AM   Delivery type: , Low Transverse   Gestational Age: 38w0d    Delivery Providers    Delivering clinician:  Asher Gannon MD   Other personnel:   Provider Role   MD Nayely Shay, STEVEN Pope                [place] Measurements    Weight:  3544 g Length:  50.2 cm   Head circum.:  33.7 cm Chest circum.:  35.6 cm           Assessment    Living status:  Living  Apgars:     1 Minute:   5 Minute:   10 Minute:   15 Minute:   20 Minute:     Skin Color:   0  1       Heart Rate:   2  2       Reflex Irritability:   2  2       Muscle Tone:   2  2       Respiratory Effort:   2  2       Total:   8  9               Apgars Assigned By:  NICU         Assisted Delivery Details:    Forceps attempted?:  No  Vacuum extractor attempted?:  No         Shoulder Dystocia    Shoulder dystocia present?:  No           Presentation and Position    Presentation:   Hernan Breech   Position:                   Interventions/Resuscitation    Method:  Tactile Stimulation, Bulb Suctioning, NICU Attended       Cord    Vessels:  3 vessels  Complications:  None  Delayed Cord Clamping?:  No  Cord Blood Disposition:  Sent with Baby  Gases Sent?:  No       Placenta    Date and time:  10/4/2017  9:49 AM  Removal:  Manual removal  Appearance:  Intact  Placenta disposition:  discarded           Labor Events:       labor:        Labor Onset Date/Time:         Dilation Complete Date/Time:         Start Pushing Date/Time:       Rupture Date/Time:              Rupture type:           Fluid Amount:        Fluid Color:        Fluid Odor:        Membrane Status (PeriCalm):        Rupture Date/Time (PeriCalm):        Fluid Amount (PeriCalm):        Fluid Color (PeriCalm):         steroids:       Antibiotics given for GBS:       Induction:       Indications for induction:        Augmentation:       Indications for augmentation:       Labor complications:       Additional complications:          Cervical ripening:                     Delivery:      Episiotomy:       Indication for Episiotomy:       Perineal Lacerations:   Repaired:      Periurethral Laceration:   Repaired:     Labial Laceration:   Repaired:     Sulcus Laceration:   Repaired:     Vaginal Laceration:   Repaired:     Cervical Laceration:   Repaired:     Repair suture:       Repair # of packets:       Vaginal delivery QBL (mL): 0      QBL (mL): 0     Combined Blood Loss (mL): 0     Vaginal Sweep Performed:       Surgicount Correct:         Other providers:       Anesthesia    Method:  Spinal, Epidural          Details (if applicable):  Trial of Labor      Categorization: Repeat    Priority: Routine   Indications for : Repeat Section;Breech   Incision Type: low transverse     Additional  information:  Forceps:    Vacuum:    Breech:    Observed anomalies    Other (Comments):

## 2017-10-05 NOTE — HOSPITAL COURSE
10/4: repeat  section with tubal ligation via bilateral salpingectomy  POD#1: doing well, pain after coughing but controlled with pain meds  POD#2: doing well, 1 severe range BP just before taking oral nifedipine xl; BP improved after medication; pain controlled.  POD#3: DC home today w/o complaints, BP controlled on Nife 60 XL (dose increased yesterday)

## 2017-10-06 PROBLEM — D64.9 ANEMIA: Status: ACTIVE | Noted: 2017-10-06

## 2017-10-06 LAB — RPR SER QL: NORMAL

## 2017-10-06 PROCEDURE — 25000003 PHARM REV CODE 250: Performed by: OBSTETRICS & GYNECOLOGY

## 2017-10-06 PROCEDURE — 11000001 HC ACUTE MED/SURG PRIVATE ROOM

## 2017-10-06 PROCEDURE — 99024 POSTOP FOLLOW-UP VISIT: CPT | Mod: ,,, | Performed by: OBSTETRICS & GYNECOLOGY

## 2017-10-06 RX ORDER — NIFEDIPINE 30 MG/1
30 TABLET, EXTENDED RELEASE ORAL ONCE
Status: COMPLETED | OUTPATIENT
Start: 2017-10-06 | End: 2017-10-06

## 2017-10-06 RX ORDER — NIFEDIPINE 30 MG/1
60 TABLET, EXTENDED RELEASE ORAL DAILY
Status: DISCONTINUED | OUTPATIENT
Start: 2017-10-07 | End: 2017-10-07 | Stop reason: HOSPADM

## 2017-10-06 RX ADMIN — OXYCODONE HYDROCHLORIDE AND ACETAMINOPHEN 1 TABLET: 10; 325 TABLET ORAL at 02:10

## 2017-10-06 RX ADMIN — OXYCODONE HYDROCHLORIDE AND ACETAMINOPHEN 1 TABLET: 5; 325 TABLET ORAL at 05:10

## 2017-10-06 RX ADMIN — NIFEDIPINE 30 MG: 30 TABLET, FILM COATED, EXTENDED RELEASE ORAL at 08:10

## 2017-10-06 RX ADMIN — DOCUSATE SODIUM 200 MG: 100 CAPSULE, LIQUID FILLED ORAL at 08:10

## 2017-10-06 RX ADMIN — IBUPROFEN 600 MG: 600 TABLET, FILM COATED ORAL at 04:10

## 2017-10-06 RX ADMIN — OXYCODONE HYDROCHLORIDE AND ACETAMINOPHEN 1 TABLET: 10; 325 TABLET ORAL at 04:10

## 2017-10-06 RX ADMIN — IBUPROFEN 600 MG: 600 TABLET, FILM COATED ORAL at 11:10

## 2017-10-06 RX ADMIN — OXYCODONE HYDROCHLORIDE AND ACETAMINOPHEN 1 TABLET: 10; 325 TABLET ORAL at 08:10

## 2017-10-06 RX ADMIN — NIFEDIPINE 30 MG: 30 TABLET, FILM COATED, EXTENDED RELEASE ORAL at 05:10

## 2017-10-06 RX ADMIN — IBUPROFEN 600 MG: 600 TABLET, FILM COATED ORAL at 08:10

## 2017-10-06 RX ADMIN — IBUPROFEN 600 MG: 600 TABLET, FILM COATED ORAL at 02:10

## 2017-10-06 RX ADMIN — OXYCODONE HYDROCHLORIDE AND ACETAMINOPHEN 1 TABLET: 10; 325 TABLET ORAL at 11:10

## 2017-10-06 NOTE — ASSESSMENT & PLAN NOTE
Mild anemia due to acute blood loss of  delivery.  No intervention at this time. Patient should continue PNV with iron.

## 2017-10-06 NOTE — PROGRESS NOTES
10/06/17 1856   Vitals   Pulse 88   BP (!) 157/93       Pt received 1 x dose 30 mg Procardia @ 1727. Pt's bp re-checked. Pt c/o palpitations. Denies headaches, blurry vision, n/v, or dizziness. MD notified and instructed to do bp checks q1h. No new orders given. Will continue to monitor.

## 2017-10-06 NOTE — HPI
38 yo  admitted 10/4/07 for repeat  delivery due to prior CD and breech fetal presentation. Patient has CHTN controlled with oral nifedipine.  She desires permanent sterilization at the time of her CD.

## 2017-10-06 NOTE — LACTATION NOTE
Lactation note- Lactation discharge instructions provided for pt. BF booklet used as visual guide. Frequency, duration, cue based feedings, I and O, and early peds follow up discussed. Pt acknowledged understanding.
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
## 2017-10-07 VITALS
BODY MASS INDEX: 36.99 KG/M2 | WEIGHT: 230.19 LBS | HEIGHT: 66 IN | TEMPERATURE: 98 F | RESPIRATION RATE: 18 BRPM | HEART RATE: 102 BPM | SYSTOLIC BLOOD PRESSURE: 146 MMHG | OXYGEN SATURATION: 96 % | DIASTOLIC BLOOD PRESSURE: 92 MMHG

## 2017-10-07 PROBLEM — O09.523 ELDERLY MULTIGRAVIDA IN THIRD TRIMESTER: Status: RESOLVED | Noted: 2017-09-01 | Resolved: 2017-10-07

## 2017-10-07 PROBLEM — O10.013 PRE-EXISTING ESSENTIAL HYPERTENSION DURING PREGNANCY IN THIRD TRIMESTER: Status: RESOLVED | Noted: 2017-09-01 | Resolved: 2017-10-07

## 2017-10-07 PROCEDURE — 25000003 PHARM REV CODE 250: Performed by: OBSTETRICS & GYNECOLOGY

## 2017-10-07 PROCEDURE — 99024 POSTOP FOLLOW-UP VISIT: CPT | Mod: ,,, | Performed by: OBSTETRICS & GYNECOLOGY

## 2017-10-07 RX ORDER — OXYCODONE AND ACETAMINOPHEN 5; 325 MG/1; MG/1
1 TABLET ORAL EVERY 4 HOURS PRN
Qty: 45 TABLET | Refills: 0 | Status: SHIPPED | OUTPATIENT
Start: 2017-10-07 | End: 2017-11-02

## 2017-10-07 RX ORDER — NIFEDIPINE 60 MG/1
60 TABLET, EXTENDED RELEASE ORAL DAILY
Qty: 30 TABLET | Refills: 11 | Status: SHIPPED | OUTPATIENT
Start: 2017-10-07 | End: 2017-11-15

## 2017-10-07 RX ORDER — DOCUSATE SODIUM 100 MG/1
200 CAPSULE, LIQUID FILLED ORAL 2 TIMES DAILY
Refills: 0 | COMMUNITY
Start: 2017-10-07 | End: 2017-11-02

## 2017-10-07 RX ADMIN — DOCUSATE SODIUM 200 MG: 100 CAPSULE, LIQUID FILLED ORAL at 08:10

## 2017-10-07 RX ADMIN — SIMETHICONE CHEW TAB 80 MG 80 MG: 80 TABLET ORAL at 06:10

## 2017-10-07 RX ADMIN — OXYCODONE HYDROCHLORIDE AND ACETAMINOPHEN 1 TABLET: 10; 325 TABLET ORAL at 12:10

## 2017-10-07 RX ADMIN — IBUPROFEN 600 MG: 600 TABLET, FILM COATED ORAL at 12:10

## 2017-10-07 RX ADMIN — IBUPROFEN 600 MG: 600 TABLET, FILM COATED ORAL at 05:10

## 2017-10-07 RX ADMIN — OXYCODONE HYDROCHLORIDE AND ACETAMINOPHEN 1 TABLET: 10; 325 TABLET ORAL at 05:10

## 2017-10-07 RX ADMIN — OXYCODONE HYDROCHLORIDE AND ACETAMINOPHEN 1 TABLET: 10; 325 TABLET ORAL at 11:10

## 2017-10-07 RX ADMIN — NIFEDIPINE 60 MG: 30 TABLET, FILM COATED, EXTENDED RELEASE ORAL at 08:10

## 2017-10-07 NOTE — DISCHARGE SUMMARY
[facility]  Obstetrics  Discharge Summary      Patient Name: Vika Donis  MRN: [id number]  Admission Date: 10/4/2017  Hospital Length of Stay: 3 days  Discharge Date and Time:  10/07/2017 11:07 AM  Attending Physician: Asher Gannon MD   Discharging Provider: Krysta Hernandez MD  Primary Care Provider: Susan Yoo MD    HPI: 38 yo  admitted 10/4/07 for repeat  delivery due to prior CD and breech fetal presentation. Patient has CHTN controlled with oral nifedipine.  She desires permanent sterilization at the time of her CD.    Procedure(s) (LRB):  DELIVERY- SECTION WITH TUBAL (N/A)     Hospital Course:   10/4: repeat  section with tubal ligation via bilateral salpingectomy  POD#1: doing well, pain after coughing but controlled with pain meds  POD#2: doing well, 1 severe range BP just before taking oral nifedipine xl; BP improved after medication; pain controlled.  POD#3: DC home today w/o complaints, BP controlled on Nife 60 XL (dose increased yesterday)        Final Active Diagnoses:    Diagnosis Date Noted POA    PRINCIPAL PROBLEM:   delivery delivered [O82] 10/04/2017 No    Anemia [D64.9] 10/06/2017 Yes      Problems Resolved During this Admission:    Diagnosis Date Noted Date Resolved POA    Hypertension affecting pregnancy, third trimester [O16.3] 10/04/2017 10/05/2017 Yes    Elderly multigravida in third trimester [O09.523] 2017 10/07/2017 Yes    Pre-existing essential hypertension during pregnancy in third trimester [O10.013] 2017 10/07/2017 Yes        Labs: All labs within the past 24 hours have been reviewed    Feeding Method: breast    Immunizations     Date Immunization Status Dose Route/Site Given by    10/07/17 0928 MMR Deferred 0.5 mL Subcutaneous/Left deltoid Michelle Holcomb RN    10/07/17 0928 Tdap Deferred 0.5 mL Intramuscular/Left deltoid Michelle Holcomb RN          Delivery:    Episiotomy:     Lacerations:     Repair  suture:     Repair # of packets:     Blood loss (ml): 0     Birth information:  YOB: 2015   Time of birth: 9:47 AM   Sex: male   Delivery type: , Low Transverse   Gestational Age: 38w0d    Delivery Clinician:      Other providers:       Additional  information:  Forceps:    Vacuum:    Breech:    Observed anomalies      Living?:           APGARS  One minute Five minutes Ten minutes   Skin color:         Heart rate:         Grimace:         Muscle tone:         Breathing:         Totals: 8  9        Placenta: Delivered:       appearance    Pending Diagnostic Studies:     None          Discharged Condition: good    Disposition:     Follow Up:  Follow-up Information     Asher Gannon MD In 2 weeks.    Specialties:  Obstetrics, Gynecology, Obstetrics and Gynecology  Why:  For wound re-check  Contact information:  [address]  [address]  [facility] [id number] [phone]             Asher Gannon MD In 6 weeks.    Specialties:  Obstetrics, Gynecology, Obstetrics and Gynecology  Why:  post partum exam  Contact information:  [address]  [address]  [facility] [address] [phone]             Asher Gannon MD In 1 week.    Specialties:  Obstetrics, Gynecology, Obstetrics and Gynecology  Why:  Blood pressure check  Contact information:  [address]  [address]  [facility] [address] [phone]                 Patient Instructions:   Patient Instructions:   1. Call the office for any bleeding >2 pads/hour for >2 hours, temperature >100.4, pain that is uncontrolled with medications, or for any other concerns.  2. Let soap and water run over incision, do not scrub. Keep incision dry.  3. No driving while on narcotics.  4. Call if malodorous drainage or redness from incision  Patient Instructions for elevated blood pressure:   1. Call the office with headache that does not improve with Tylenol or visual changes  2. Call the office with blood pressure greater than or equal to  160/110  3. If you were discharged on a blood pressure medication, make sure you take the medication as prescribed  No discharge procedures on file.  Medications:  Current Discharge Medication List      START taking these medications    Details   docusate sodium (COLACE) 100 MG capsule Take 2 capsules (200 mg total) by mouth 2 (two) times daily.  Refills: 0      nifedipine (ADALAT CC) 60 MG TbSR Take 1 tablet (60 mg total) by mouth once daily.  Qty: 30 tablet, Refills: 11      oxycodone-acetaminophen (PERCOCET) 5-325 mg per tablet Take 1 tablet by mouth every 4 (four) hours as needed.  Qty: 45 tablet, Refills: 0         CONTINUE these medications which have NOT CHANGED    Details   nifedipine (PROCARDIA-XL) 30 MG (OSM) 24 hr tablet Take 1 tablet (30 mg total) by mouth once daily.  Qty: 30 tablet, Refills: 11    Associated Diagnoses: Hypertension affecting pregnancy, third trimester      PNV 22/IRON,GLUC/FOLIC/DSS/DHA (PNV OB+DHA ORAL) Take by mouth.      BREAST PUMP MISC              Krysta Hernandez MD  Obstetrics  [facility]

## 2017-10-07 NOTE — PROGRESS NOTES
[facility]  Obstetrics  Postpartum Progress Note    Patient Name: Vika Donis  MRN: [id number]  Admission Date: 10/4/2017  Hospital Length of Stay: 3 days  Attending Physician: Asher Gannon MD  Primary Care Provider: Susan Yoo MD    Subjective:     Principal Problem: delivery delivered    Hospital course: 10/4: repeat  section with tubal ligation via bilateral salpingectomy  POD#1: doing well, pain after coughing but controlled with pain meds  POD#2: doing well, 1 severe range BP just before taking oral nifedipine xl; BP improved after medication; pain controlled.  POD#3: DC home today w/o complaints, BP controlled on Nife 60 XL (dose increased yesterday)    Interval History:     She is doing well this morning. She is tolerating a regular diet without nausea or vomiting. She is voiding spontaneously. She is ambulating. She has passed flatus, and has not a BM. Vaginal bleeding is mild. She denies fever or chills. Abdominal pain is mild and controlled with oral medications. She is breastfeeding.     Objective:     Vital Signs (Most Recent):  Temp: 98.4 °F (36.9 °C) (10/07/17 0818)  Pulse: 103 (10/07/17 0818)  Resp: 18 (10/07/17 0818)  BP: (!) 141/82 (10/07/17 0818)  SpO2: 96 % (10/07/17 0500) Vital Signs (24h Range):  Temp:  [98 °F (36.7 °C)-99.2 °F (37.3 °C)] 98.4 °F (36.9 °C)  Pulse:  [] 103  Resp:  [16-18] 18  SpO2:  [96 %-98 %] 96 %  BP: (137-176)/(72-99) 141/82     Weight: 104.4 kg (230 lb 2.6 oz)  Body mass index is 37.15 kg/m².      Intake/Output Summary (Last 24 hours) at 10/07/17 1105  Last data filed at 10/07/17 0400   Gross per 24 hour   Intake             1000 ml   Output                0 ml   Net             1000 ml       Significant Labs:  Lab Results   Component Value Date    GROUPTRH B POS 10/04/2017    HEPBSAG Negative 04/10/2017    STREPBCULT No Group B Streptococcus isolated 2017     No results for input(s): HGB, HCT in the last 48 hours.    I have  personallly reviewed all pertinent lab results from the last 24 hours.    Physical Exam:   Constitutional: She is oriented to person, place, and time. She appears well-developed and well-nourished. No distress.    HENT:   Head: Normocephalic and atraumatic.      Cardiovascular: Normal rate and regular rhythm.     Pulmonary/Chest: Effort normal. No respiratory distress.        Abdominal: Soft. Bowel sounds are normal. She exhibits no mass. Abdominal incision: c/d/i. There is no tenderness. There is no guarding.     Genitourinary: Uterus normal. No vaginal discharge found.   Genitourinary Comments: Firm fundus below umbilicus           Musculoskeletal: Normal range of motion and moves all extremeties. She exhibits no edema or tenderness.       Neurological: She is alert and oriented to person, place, and time.    Skin: Skin is warm. She is not diaphoretic.    Psychiatric: She has a normal mood and affect. Her behavior is normal. Judgment and thought content normal.       Assessment/Plan:     37 y.o. female  for:    *  delivery delivered    Routine post operative care, DC home today        Anemia    Mild anemia due to acute blood loss of  delivery.  No intervention at this time. Patient should continue PNV with iron.        Post partum hypertension: increased Nife 60XL yesterday with better control of Bp. Will DC home on that dose and have patient f/u in 1 week for BP check. PreE precautions given.    Disposition: As patient meets milestones, will plan to discharge today.    Krysta Hernandez MD  Obstetrics  [facility]

## 2017-10-07 NOTE — PLAN OF CARE
Problem: Patient Care Overview  Goal: Plan of Care Review  Outcome: Outcome(s) achieved Date Met: 10/07/17  VSS. Blood pressures 140's/80-90's. Patient denies headache, vision changes, etc. Ambulating and voiding without difficulty. Fundus is firm and midline. Vaginal bleeding is small. Tolerating a regular diet. Pain controlled with oral pain medication. MOther baby care guide reviewed on previous shift. Additional questions answered. Ok to d/c home per MD order. Discharge instructions reviewed with patient. Paperwork signed. ID bands verified with infant.

## 2017-10-07 NOTE — LACTATION NOTE
10/07/17 0820   Maternal Infant Assessment   Breast Density filling;Bilateral:  (per patient)   Pain/Comfort Assessments   Pain Assessment Performed Yes       Number Scale   Presence of Pain denies   Location - Side Bilateral   Location nipple(s)   Maternal Infant Feeding   Time Spent (min) 0-15 min   Breastfeeding Education adequate infant intake;importance of skin-to-skin contact   Lactation Interventions   Attachment Promotion counseling provided;face-to-face positioning promoted;privacy provided;role responsibility promoted;skin-to-skin contact encouraged   Breastfeeding Assistance feeding cue recognition promoted;support offered   Maternal Breastfeeding Support encouragement offered;infant-mother separation minimized;lactation counseling provided;maternal hydration promoted;maternal rest encouraged   Praised patient for breastfeeding; requested she call lactation for assistance; support and encouragement provided;

## 2017-10-07 NOTE — PROGRESS NOTES
10/06/17 2015 10/06/17 2035 10/06/17 2036   Vitals   Temp 99.1 °F (37.3 °C) --  --    Temp src Oral --  --    Pulse 101 100 --    Resp 18 --  --    BP (!) 176/72  (RN notified) (!) 159/92 (!) 156/90   BP Location Left arm Left arm Left arm   BP Method Automatic Automatic Manual   SpO2 98 % --  --        10/06/17 2139   Vitals   Temp --    Temp src --    Pulse 101   Resp --    BP (!) 140/84   BP Location Left arm   BP Method Automatic   SpO2 --        Notified MD regarding pt's bp. Pt asymptomatic. MD gave telephone order to resume bp checks q4h. No new orders given. Will continue to monitor.

## 2017-10-07 NOTE — SUBJECTIVE & OBJECTIVE
Hospital course: 10/: repeat  section with tubal ligation via bilateral salpingectomy  POD#1: doing well, pain after coughing but controlled with pain meds  POD#2: doing well, 1 severe range BP just before taking oral nifedipine xl; BP improved after medication; pain controlled.  POD#3: DC home today w/o complaints, BP controlled on Nife 60 XL (dose increased yesterday)    Interval History:     She is doing well this morning. She is tolerating a regular diet without nausea or vomiting. She is voiding spontaneously. She is ambulating. She has passed flatus, and has not a BM. Vaginal bleeding is mild. She denies fever or chills. Abdominal pain is mild and controlled with oral medications. She is breastfeeding.     Objective:     Vital Signs (Most Recent):  Temp: 98.4 °F (36.9 °C) (10/07/17 0818)  Pulse: 103 (10/07/17 0818)  Resp: 18 (10/07/17 0818)  BP: (!) 141/82 (10/07/17 0818)  SpO2: 96 % (10/07/17 0500) Vital Signs (24h Range):  Temp:  [98 °F (36.7 °C)-99.2 °F (37.3 °C)] 98.4 °F (36.9 °C)  Pulse:  [] 103  Resp:  [16-18] 18  SpO2:  [96 %-98 %] 96 %  BP: (137-176)/(72-99) 141/82     Weight: 104.4 kg (230 lb 2.6 oz)  Body mass index is 37.15 kg/m².      Intake/Output Summary (Last 24 hours) at 10/07/17 1105  Last data filed at 10/07/17 0400   Gross per 24 hour   Intake             1000 ml   Output                0 ml   Net             1000 ml       Significant Labs:  Lab Results   Component Value Date    GROUPTRH B POS 10/04/2017    HEPBSAG Negative 04/10/2017    STREPBCULT No Group B Streptococcus isolated 2017     No results for input(s): HGB, HCT in the last 48 hours.    I have personallly reviewed all pertinent lab results from the last 24 hours.    Physical Exam:   Constitutional: She is oriented to person, place, and time. She appears well-developed and well-nourished. No distress.    HENT:   Head: Normocephalic and atraumatic.      Cardiovascular: Normal rate and regular rhythm.      Pulmonary/Chest: Effort normal. No respiratory distress.        Abdominal: Soft. Bowel sounds are normal. She exhibits no mass. Abdominal incision: c/d/i. There is no tenderness. There is no guarding.     Genitourinary: Uterus normal. No vaginal discharge found.   Genitourinary Comments: Firm fundus below umbilicus           Musculoskeletal: Normal range of motion and moves all extremeties. She exhibits no edema or tenderness.       Neurological: She is alert and oriented to person, place, and time.    Skin: Skin is warm. She is not diaphoretic.    Psychiatric: She has a normal mood and affect. Her behavior is normal. Judgment and thought content normal.

## 2017-10-09 ENCOUNTER — TELEPHONE (OUTPATIENT)
Dept: OBSTETRICS AND GYNECOLOGY | Facility: CLINIC | Age: 37
End: 2017-10-09

## 2017-10-09 RX ORDER — HYDROCODONE BITARTRATE AND ACETAMINOPHEN 5; 325 MG/1; MG/1
1 TABLET ORAL EVERY 6 HOURS PRN
Qty: 15 TABLET | Refills: 0 | Status: SHIPPED | OUTPATIENT
Start: 2017-10-09 | End: 2017-10-18

## 2017-10-09 NOTE — TELEPHONE ENCOUNTER
Pt thinks she is allergic to either the epidural or the percocet she is prescribed.  C/o Itching since delivering and now has a rash.  Its worse now than when she was in the hospital. Recommended a dose of Benadryl.  She is requesting another narcotic pain medication because she is still in pain from     Allergies and pharmacy UTD

## 2017-10-09 NOTE — TELEPHONE ENCOUNTER
Dr. Gannon-- pt is 5 days post partum and states that she has been experiencing itching since delivering her baby. Pt states that during discharge the nurse thought that it may be caused by the epidural. Pt states that she now has a rash all over her body and she thinks it may be her pain medication. Pt states that she needs something because she is still in pain and itchy. Pt's # [phone]

## 2017-10-18 ENCOUNTER — POSTPARTUM VISIT (OUTPATIENT)
Dept: OBSTETRICS AND GYNECOLOGY | Facility: CLINIC | Age: 37
End: 2017-10-18
Attending: OBSTETRICS & GYNECOLOGY
Payer: COMMERCIAL

## 2017-10-18 VITALS
BODY MASS INDEX: 33.66 KG/M2 | SYSTOLIC BLOOD PRESSURE: 140 MMHG | HEIGHT: 66 IN | DIASTOLIC BLOOD PRESSURE: 80 MMHG | WEIGHT: 209.44 LBS

## 2017-10-18 DIAGNOSIS — Z48.89 POSTOPERATIVE VISIT: Primary | ICD-10-CM

## 2017-10-18 PROCEDURE — 99024 POSTOP FOLLOW-UP VISIT: CPT | Mod: S$GLB,,, | Performed by: OBSTETRICS & GYNECOLOGY

## 2017-10-18 PROCEDURE — 99999 PR PBB SHADOW E&M-EST. PATIENT-LVL II: CPT | Mod: PBBFAC,,, | Performed by: OBSTETRICS & GYNECOLOGY

## 2017-10-18 NOTE — PROGRESS NOTES
CC Post op inc check    Subjective:   Patient reports no nausea or vomiting.    Activity level: Normal.    Pain control: Well controlled.    Reports no postpartum depression, overall doing well.  Having some mild headaches occasionally.  Also with mild lower extremity edema.  Patient with chronic hypertension.  On Procardia 60 XL.    Objective:  Vitals:    10/18/17 0956   BP: (!) 140/80     General appearance: Comfortable and well-appearing.    Abdomen: Abdomen is soft, non distended   Tenderness: There is no abdominal tenderness.    Wound:  Clean.  There is no dehiscence.  There is no drainage.   slight pulling on the right side of incision.  Otherwise clean dry and intact.   Extremities trace lower extremity edema    Assessment:   s/p  delivery- 2 week post op  Condition: In stable condition.   Chronic hypertension continue Procardia 60 XL for now.  At 6 week postpartum visit will consider converting back to hydrochlorothiaze    Plan:  Encourage ambulation.  Continue wound care.  Recommend to apply Mederma to incision  Pelvic rest for 6 weeks postpartum.

## 2017-10-31 ENCOUNTER — PATIENT MESSAGE (OUTPATIENT)
Dept: OBSTETRICS AND GYNECOLOGY | Facility: CLINIC | Age: 37
End: 2017-10-31

## 2017-10-31 ENCOUNTER — TELEPHONE (OUTPATIENT)
Dept: OBSTETRICS AND GYNECOLOGY | Facility: CLINIC | Age: 37
End: 2017-10-31

## 2017-10-31 NOTE — TELEPHONE ENCOUNTER
Pt said she keeps receiving a bill for $120. The last time she spoke with someone they told her it was paid but she keeps getting a bill. Pt wants to make sure it is still showing paid before she pays the bill for her baby's circ.

## 2017-11-02 ENCOUNTER — POSTPARTUM VISIT (OUTPATIENT)
Dept: OBSTETRICS AND GYNECOLOGY | Facility: CLINIC | Age: 37
End: 2017-11-02
Payer: COMMERCIAL

## 2017-11-02 VITALS
DIASTOLIC BLOOD PRESSURE: 82 MMHG | SYSTOLIC BLOOD PRESSURE: 138 MMHG | WEIGHT: 202.81 LBS | HEIGHT: 66 IN | BODY MASS INDEX: 32.59 KG/M2

## 2017-11-02 DIAGNOSIS — T81.89XA PROBLEM INVOLVING SURGICAL INCISION: Primary | ICD-10-CM

## 2017-11-02 PROCEDURE — 99212 OFFICE O/P EST SF 10 MIN: CPT | Mod: 24,S$GLB,, | Performed by: OBSTETRICS & GYNECOLOGY

## 2017-11-02 PROCEDURE — 99999 PR PBB SHADOW E&M-EST. PATIENT-LVL II: CPT | Mod: PBBFAC,,, | Performed by: OBSTETRICS & GYNECOLOGY

## 2017-11-02 NOTE — PROGRESS NOTES
Postpartum     Patient states that occasionally she notices a slight red spotting from the right aspect of her  incision.  She denies pain or drainage.    Vitals:    17 1313   BP: 138/82       Physical Exam-  General- A+O x 3  Abdomen- Soft NT, ND  Incision- intact, healing well, no sign of infection 1-2 mm small area of granulation tissue right at the right corner of the incision.  Excised with ease and treated with silver nitrate.      Lab Results   Component Value Date    WBC 6.07 10/05/2017    HGB 10.7 (L) 10/05/2017    HCT 31.8 (L) 10/05/2017    MCV 88 10/05/2017     10/05/2017       A-  S/P  Delivery  4 weeks out.    P-  Silver nitrate treated right aspect of incision.  Will follow-up in 2 weeks as scheduled.

## 2017-11-15 ENCOUNTER — POSTPARTUM VISIT (OUTPATIENT)
Dept: OBSTETRICS AND GYNECOLOGY | Facility: CLINIC | Age: 37
End: 2017-11-15
Attending: OBSTETRICS & GYNECOLOGY
Payer: COMMERCIAL

## 2017-11-15 VITALS
BODY MASS INDEX: 34.16 KG/M2 | HEIGHT: 65 IN | DIASTOLIC BLOOD PRESSURE: 86 MMHG | SYSTOLIC BLOOD PRESSURE: 134 MMHG | WEIGHT: 205 LBS

## 2017-11-15 PROCEDURE — 99999 PR PBB SHADOW E&M-EST. PATIENT-LVL II: CPT | Mod: PBBFAC,,, | Performed by: OBSTETRICS & GYNECOLOGY

## 2017-11-15 PROCEDURE — 0503F POSTPARTUM CARE VISIT: CPT | Mod: S$GLB,,, | Performed by: OBSTETRICS & GYNECOLOGY

## 2017-11-15 NOTE — PROGRESS NOTES
"Subjective:       Vika Donis is a 37 y.o. female who presents for a postpartum visit. She is 6 weeks postpartum following a . I have fully reviewed the prenatal and intrapartum course. The delivery was at 38 gestational weeks. Anesthesia: epidural. Postpartum course has been uncomplicated. Baby's course has been uncomplicated. Baby is feeding by breast. Bleeding no bleeding. Bowel function is normal. Bladder function is normal. Patient is not sexually active. Contraception method is tubal ligation. Postpartum depression screening: negative.      The patient's history were reviewed and updated.    Review of Systems  Review of Systems       Objective:      /86   Ht 5' 5" (1.651 m)   Wt 93 kg (205 lb 0.4 oz)   LMP 2017   Breastfeeding? Yes   BMI 34.12 kg/m²    General:  alert and cooperative   Abdomen: soft, non-tender; bowel sounds normal; no masses,  no organomegaly Incision- intact, healing well, no sign of infection      Vulva:  normal   Vagina: normal vagina, no discharge, exudate, lesion, or erythema   Cervix:  no lesions   Corpus: normal size, contour, position, consistency, mobility, non-tender   Adnexa:  no mass, fullness, tenderness   Rectal Exam: Not performed.          Assessment:      6 week postpartum exam. Pap smear not done at today's visit.     Plan:      1. Contraception: tubal ligation  2. Follow up for annual in March/ April  "

## 2017-12-13 ENCOUNTER — PATIENT MESSAGE (OUTPATIENT)
Dept: OBSTETRICS AND GYNECOLOGY | Facility: CLINIC | Age: 37
End: 2017-12-13

## 2017-12-20 ENCOUNTER — TELEPHONE (OUTPATIENT)
Dept: OBSTETRICS AND GYNECOLOGY | Facility: CLINIC | Age: 37
End: 2017-12-20

## 2017-12-20 NOTE — TELEPHONE ENCOUNTER
Patient notified that the letter she has requested for her return to work has been sent to her portal

## 2017-12-20 NOTE — TELEPHONE ENCOUNTER
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 Pt stated she would like it to say to return to work on 1/8/18 with limited physical activity and to resume physical activity in April of 2018 for her PT test. She would like the letter uploaded to the portal.

## 2017-12-20 NOTE — TELEPHONE ENCOUNTER
Dr. Gannon--pt state that she needs a letter for work. Pt is in the  and states that she will not be able to do everything that that will be requiring her to do after her . Pt states that she needs a letter stating that she can return to work on 2018 and that she can only do limited physical fitness for her physical fitness test. Pt states to call her if you have any questions. Pt's #  [phone]

## 2018-02-19 ENCOUNTER — OFFICE VISIT (OUTPATIENT)
Dept: OBSTETRICS AND GYNECOLOGY | Facility: CLINIC | Age: 38
End: 2018-02-19
Payer: COMMERCIAL

## 2018-02-19 ENCOUNTER — TELEPHONE (OUTPATIENT)
Dept: OBSTETRICS AND GYNECOLOGY | Facility: CLINIC | Age: 38
End: 2018-02-19

## 2018-02-19 VITALS
WEIGHT: 205.56 LBS | SYSTOLIC BLOOD PRESSURE: 100 MMHG | HEIGHT: 66 IN | BODY MASS INDEX: 33.04 KG/M2 | DIASTOLIC BLOOD PRESSURE: 72 MMHG

## 2018-02-19 DIAGNOSIS — Z11.3 SCREENING FOR STDS (SEXUALLY TRANSMITTED DISEASES): Primary | ICD-10-CM

## 2018-02-19 LAB
CANDIDA RRNA VAG QL PROBE: NEGATIVE
G VAGINALIS RRNA GENITAL QL PROBE: POSITIVE
T VAGINALIS RRNA GENITAL QL PROBE: NEGATIVE

## 2018-02-19 PROCEDURE — 99213 OFFICE O/P EST LOW 20 MIN: CPT | Mod: S$GLB,,, | Performed by: NURSE PRACTITIONER

## 2018-02-19 PROCEDURE — 87480 CANDIDA DNA DIR PROBE: CPT

## 2018-02-19 PROCEDURE — 87491 CHLMYD TRACH DNA AMP PROBE: CPT

## 2018-02-19 PROCEDURE — 3008F BODY MASS INDEX DOCD: CPT | Mod: S$GLB,,, | Performed by: NURSE PRACTITIONER

## 2018-02-19 PROCEDURE — 99999 PR PBB SHADOW E&M-EST. PATIENT-LVL III: CPT | Mod: PBBFAC,,, | Performed by: NURSE PRACTITIONER

## 2018-02-19 RX ORDER — FERROUS SULFATE 325(65) MG
325 TABLET, DELAYED RELEASE (ENTERIC COATED) ORAL 2 TIMES DAILY WITH MEALS
COMMUNITY
End: 2018-05-21

## 2018-02-19 NOTE — TELEPHONE ENCOUNTER
Pt c/o increased discharge with an odor.  She is going through a divorce and knows her  has other partners, recently had intercourse with him.  Scheduled today with Debbie.

## 2018-02-19 NOTE — PROGRESS NOTES
Chief Complaint   Patient presents with    vaginal discharge with odor     bone pt. wants STD testing. possible exposure, has weird smell and discharge       (Dr. Gannon patient)    Last PAP:  10/25/2016 Normal,  HPV negative      Vika Donis is a 37 y.o. female  presents with complaint of vaginal discharge for 1 week.  She denies itching.  reports odor.  She states the discharge is white.  No LMP recorded..  Ms. Donis is currently sexually active with a single male partner.  She is in process of divorce with , but is aware that he has had other partners and was recently sexually active with him.  She would like STD screening today for GC/CT/Trich.    Past Medical History:   Diagnosis Date    Abnormal Pap smear of cervix      HPV+    History of multiple miscarriages     HPV (human papilloma virus) infection     Hypertension      Past Surgical History:   Procedure Laterality Date     SECTION      x2     Social History   Substance Use Topics    Smoking status: Never Smoker    Smokeless tobacco: Never Used    Alcohol use Yes      Comment: once a week      Family History   Problem Relation Age of Onset    Diabetes Paternal Grandmother     No Known Problems Father     Hypertension Mother     Breast cancer Maternal Aunt     Cancer Maternal Aunt     No Known Problems Son     No Known Problems Brother     No Known Problems Sister     Colon cancer Neg Hx     Ovarian cancer Neg Hx      OB History    Para Term  AB Living   7 3 1 2 4 3   SAB TAB Ectopic Multiple Live Births   2 2   0 3      # Outcome Date GA Lbr Drake/2nd Weight Sex Delivery Anes PTL Lv   7 Term 10/04/17 38w0d  3.544 kg (7 lb 13 oz) M CS-LTranv Spinal, EPI  RASHAD   6  13 36w0d  2.977 kg (6 lb 9 oz) M CS-Unspec Spinal  RASHAD      Complications: Preeclampsia   5 TAB  5w0d       DEC   4  07 36w0d  2.126 kg (4 lb 11 oz) F CS-Unspec Spinal  RASHAD      Complications: Preeclampsia  "  3 SAB 2006 5w0d       FD   2 SAB 2004 11w0d       FD   1 TAB 2003 8w0d       DEC            ROS:  GENERAL: No fever, chills, fatigue or weight loss.  VULVAR: denies pain, denies lesions and denies itching.  VAGINAL: denies itching, denies abnormal bleeding and denies lesions. Reports malodorous discharge.  ABDOMEN: denies abdominal pain. Denies nausea. Denies vomiting. No diarrhea. No constipation  BREAST: Denies pain. No lumps. No discharge.  URINARY: No incontinence, no nocturia, no frequency and no dysuria.  CARDIOVASCULAR: No chest pain. No shortness of breath. No leg cramps.  NEUROLOGICAL: No headaches. No vision changes.    Vitals:    02/19/18 1341   BP: 100/72   Weight: 93.2 kg (205 lb 9.3 oz)   Height: 5' 6" (1.676 m)   PainSc: 0-No pain     Body mass index is 33.18 kg/m².    PHYSICAL EXAM:   External genitalia: normal external genitalia, no erythema, no discharge  and urethra: normal appearing urethra with no masses, tenderness or lesions.   Vagina: normal appearing vagina with normal color and white milky discharge, no lesions.    Cervix: normal appearing cervix without discharge or lesions.   Uterus: uterus is normal size, shape, consistency and nontender. Adnexa:  normal adnexa and no mass, fullness, tenderness:  .    ASSESSMENT and PLAN:  Screening for STDs (sexually transmitted diseases)  -     Vaginosis Screen by DNA Probe  -     C. trachomatis/N. gonorrhoeae by AMP DNA Cervix        * Will call with results when finalized.    * Use of the [email or web address] Patient Portal discussed and encouraged during today's visit.     Self-Help Tips for Vulvar Skin Care  Prevention of recurrent vulvar and vaginal irritation often begins with the use of hypoallergenic products, plus avoidance of exposures that irritate sensitive skin.     Clothing and Laundry:  · Wear all-white cotton underwear.   · Do not wear pantyhose (wear thigh high or knee high hose instead).   · Wear loose-fitting pants or skirts.   · Remove wet " bathing suits and exercise clothing promptly.   · Use hypoallergenic, dermatologically approved detergent such as Tide Free, or All Free and Clear.  · Double-rinse underwear and any other clothing that comes into contact with the vulva.   · Do not use fabric softener on undergarments.  Hygiene:  · Use soft, white, unscented toilet paper.   · Use lukewarm or cool sitz baths to relieve burning and irritation.   · Avoid getting shampoo on the vulvar area.   · Do not use bubble bath, feminine hygiene products, or any perfumed creams or soaps.   · Wash the vulva with cool to lukewarm water only.  · Rinse the vulva with water after urination.  · Urinate before the bladder is full.   · Prevent constipation by adding fiber to your diet (if necessary, use a psyllium product such as Metamucil) and drinking at least 8 glasses of water daily.  · Use unscented menstrual pads and tampons. Do not wear panty liners daily.  Sexual intercourse:  · Use a lubricant that is water soluble, e.g., Astroglide or KY and unscented.  · Ask your physician for a prescription for a topical anesthestic, e.g., Lidocaine gel 5%. (This may sting for the first 3-5 minutes after application.)  · Apply ice or a frozen blue gel pack (lunch box size) wrapped in one layer of a hand towel to relieve burning after intercourse.   · Urinate (to prevent infection) and rinse vulva with cool water after sexual intercourse.  · Do not use contraceptive creams or spermicides.  Physical Activities:  · Avoid exercises that put direct pressure on the vulva such as bicycle riding and horseback riding.  · Limit intense exercises that create a lot of friction in the vulvar area (try lower intensity exercises such as walking).  · Use a frozen gel pack wrapped in a towel to relieve symptoms after exercise.   · Enroll in an exercise class such as yoga to learn stretching and relaxation exercises.  · Don't swim in highly chlorinated pools.   · Avoid the use of hot tubs.    To  help maintain a healthy vaginal pH:  For vaginal discomfort or feminine odor, she may use OTC Rephresh gel.  It is a vaginal gel used to neutralize and maintain a healthy vaginal pH.  Patients who get Yeast or BV often use this to help reduce risk of vaginal issues.  Maintaining a healthy pH helps beneficial bacteria to thrive and inhibits overgrowths of yeast and pathogenic bacteria.  It can be found at any drugstore on the feminine product aisle, and can be used as frequently as every 3 days.      FOLLOW UP: Follow-up if symptoms worsen or fail to improve.

## 2018-02-20 LAB
C TRACH DNA SPEC QL NAA+PROBE: NOT DETECTED
N GONORRHOEA DNA SPEC QL NAA+PROBE: NOT DETECTED

## 2018-02-20 RX ORDER — TINIDAZOLE 500 MG/1
TABLET ORAL
Qty: 8 TABLET | Refills: 0 | Status: SHIPPED | OUTPATIENT
Start: 2018-02-20 | End: 2018-05-21

## 2018-02-21 ENCOUNTER — TELEPHONE (OUTPATIENT)
Dept: OBSTETRICS AND GYNECOLOGY | Facility: CLINIC | Age: 38
End: 2018-02-21

## 2018-02-21 NOTE — TELEPHONE ENCOUNTER
----- Message from Debbie Goodson NP sent at 2/20/2018  4:23 PM CST -----  Patient's AFFIRM swab was (+) for BV.    Please call her and let her know Rx sent for (Tinidazole x 2 days).  Remind patient she cannot drink while taking medication or for 3 days after completion of medication.      # Instruct patient to contact pharmacy to check on status of RX)

## 2018-02-21 NOTE — TELEPHONE ENCOUNTER
Patient notified of swab results per Debbie. Instructed not to drink while on the medication or for 3 days after completion of the medication. Verbalized understanding

## 2018-03-25 NOTE — PROGRESS NOTES
Doing well & without c/o.    OGTT & CBC labs collected today.  Labor/bleeding/decr. FM prec given.  
Pt has no complaints. MICHAEL   
No

## 2018-05-21 ENCOUNTER — OFFICE VISIT (OUTPATIENT)
Dept: OBSTETRICS AND GYNECOLOGY | Facility: CLINIC | Age: 38
End: 2018-05-21
Payer: COMMERCIAL

## 2018-05-21 VITALS
SYSTOLIC BLOOD PRESSURE: 120 MMHG | HEIGHT: 66 IN | BODY MASS INDEX: 32.05 KG/M2 | DIASTOLIC BLOOD PRESSURE: 82 MMHG | WEIGHT: 199.44 LBS

## 2018-05-21 DIAGNOSIS — Z01.419 ENCOUNTER FOR GYNECOLOGICAL EXAMINATION: Primary | ICD-10-CM

## 2018-05-21 DIAGNOSIS — Z11.3 SCREENING EXAMINATION FOR STD (SEXUALLY TRANSMITTED DISEASE): ICD-10-CM

## 2018-05-21 LAB
C TRACH DNA SPEC QL NAA+PROBE: NOT DETECTED
CANDIDA RRNA VAG QL PROBE: NEGATIVE
G VAGINALIS RRNA GENITAL QL PROBE: NEGATIVE
N GONORRHOEA DNA SPEC QL NAA+PROBE: NOT DETECTED
T VAGINALIS RRNA GENITAL QL PROBE: NEGATIVE

## 2018-05-21 PROCEDURE — 87480 CANDIDA DNA DIR PROBE: CPT

## 2018-05-21 PROCEDURE — 87510 GARDNER VAG DNA DIR PROBE: CPT

## 2018-05-21 PROCEDURE — 87491 CHLMYD TRACH DNA AMP PROBE: CPT

## 2018-05-21 PROCEDURE — 99999 PR PBB SHADOW E&M-EST. PATIENT-LVL III: CPT | Mod: PBBFAC,,, | Performed by: NURSE PRACTITIONER

## 2018-05-21 PROCEDURE — 99395 PREV VISIT EST AGE 18-39: CPT | Mod: S$GLB,,, | Performed by: NURSE PRACTITIONER

## 2018-05-21 NOTE — PROGRESS NOTES
Chief Complaint: Well Woman Exam     (Dr. Gannon patient)    Last Pap:  10/25/2016 Normal,  HPV negative    Last Mammo:  N/a (still currently breastfeeding)    HPI:      Vika Donis is a 37 y.o.  who presents today for well woman exam.    LMP: Patient's last menstrual period was 2018.  States menses are regular monthly, and no last approx 7 days (used to last 5 days) and are slightly heavier than they were before she had the baby; not more painful though.   No issues, problems, or complaints. Specifically, patient denies abnormal vaginal bleeding, discharge, pelvic pain, urinary problems, or changes in appetite.   Ms. Donis is currently sexually active with a single male partner.   She is still in process of  from her , but has had intercourse with him twice in recent past, and unsure if he has had other partners, so would like STD testing today.      Past Medical History:   Diagnosis Date    Abnormal Pap smear of cervix      HPV+    History of multiple miscarriages     HPV (human papilloma virus) infection     Hypertension      Past Surgical History:   Procedure Laterality Date     SECTION      x2     Social History     Social History    Marital status:      Spouse name: N/A    Number of children: N/A    Years of education: N/A     Occupational History    Not on file.     Social History Main Topics    Smoking status: Never Smoker    Smokeless tobacco: Never Used    Alcohol use Yes      Comment: once a week     Drug use: No    Sexual activity: Yes     Partners: Male      Comment: : Piyushi     Other Topics Concern    Not on file     Social History Narrative    No narrative on file     Family History   Problem Relation Age of Onset    Diabetes Paternal Grandmother     No Known Problems Father     Hypertension Mother     Breast cancer Maternal Aunt     Cancer Maternal Aunt     No Known Problems Son     No Known Problems Brother      "No Known Problems Sister     Colon cancer Neg Hx     Ovarian cancer Neg Hx      OB History      Para Term  AB Living    7 3 1 2 4 3    SAB TAB Ectopic Multiple Live Births    2 2   0 3          ROS:     GENERAL: Denies unintentional weight gain or weight loss. Feeling well overall.   SKIN: Denies rash or lesions.   HEENT: Denies headaches, or vision changes.   CARDIOVASCULAR: Denies palpitations or chest pain.   RESPIRATORY: Denies shortness of breath or dyspnea on exertion.  BREASTS: The patient performs breast self-examination and denies pain, lumps, or nipple discharge.   ABDOMEN: Denies abdominal pain, constipation, diarrhea, nausea, vomiting, change in appetite.  URINARY: Denies frequency, dysuria, hematuria.  NEUROLOGIC: Denies syncope or weakness.   PSYCHIATRIC: Denies depression, anxiety or mood swings.  VULVAR: No pain, no lesions and no itching.  VAGINAL: No relaxation, no itching, no abnormal bleeding and no lesions.    Physical Exam:     PHYSICAL EXAM:  /82   Ht 5' 6" (1.676 m)   Wt 90.5 kg (199 lb 6.5 oz)   LMP 2018   Breastfeeding? Yes   BMI 32.19 kg/m²   Body mass index is 32.19 kg/m².     APPEARANCE: Well nourished, well developed, in no acute distress.  PSYCH: Appropriate mood and affect.  SKIN: No acne or hirsutism.  NECK: Neck symmetric without masses or thyromegaly  NODES: No inguinal, axillary, or supraclavicular lymph node enlargement  CHEST: Normal respiratory effort.  ABDOMEN: Soft.  No tenderness or masses.    BREASTS: Symmetrical, no skin changes or visible lesions.  No palpable masses or nipple discharge bilaterally.  PELVIC: External genitalia: normal external genitalia, no erythema, no discharge  and urethra: normal appearing urethra with no masses, tenderness or lesions.  Normal hair distribution.  Vagina normal appearing vagina with normal color and discharge, no lesions.  Cervix normal appearing cervix without discharge or lesions, cervical motion " tenderness absent.  No significant cystocele or rectocele.  Bimanual exam shows uterus to be uterus is normal size, shape, consistency and nontender.  Adnexa normal adnexa and no mass, fullness, tenderness.    EXTREMITIES: No edema.       Assessment/Plan:     Encounter for gynecological examination    Screening examination for STD (sexually transmitted disease)  -     Vaginosis Screen by DNA Probe  -     C. trachomatis/N. gonorrhoeae by AMP DNA Cervix      Counseling:     Patient was counseled today on current ASCCP pap guidelines, the recommendation for yearly pelvic exams, healthy diet and exercise routines, annual mammograms starting at age 40, and breast self awareness. She is to see her PCP for other health maintenance.     Use of the [employer] Patient Portal discussed and encouraged during today's visit.     Follow-up in about 1 year (around 5/21/2019) for Annual.

## 2018-05-22 NOTE — PROGRESS NOTES
Hal Sandy,   Good news - your STD labs for Gonorrhea, Chlamydia, trichomonas were all negative!  The swabs for Yeast and Bacterial Vaginosis were also negative.  Have a great day,   DARELL Tariq

## 2018-09-02 ENCOUNTER — NURSE TRIAGE (OUTPATIENT)
Dept: ADMINISTRATIVE | Facility: CLINIC | Age: 38
End: 2018-09-02

## 2018-09-02 NOTE — TELEPHONE ENCOUNTER
Patient called to report the following:     -being treated for boil under arm   -allergic reaction after 2 dose of  dicloxacillin  -now has whelps and itching and redness all over my body  -arms and legs are still red and itching   -nausea      Education completed per [facility] On Call Care Advice including report to ED and when to call back. Patient verbalized understating. Caregiver verbalized understanding.    Reason for Disposition   [1] Widespread hives AND [2] onset < 2 hours of exposure to 1st dose of drug    Protocols used: ST RASH - WIDESPREAD ON DRUGS-A-AH

## 2019-02-14 ENCOUNTER — OFFICE VISIT (OUTPATIENT)
Dept: URGENT CARE | Facility: CLINIC | Age: 39
End: 2019-02-14
Payer: COMMERCIAL

## 2019-02-14 VITALS
OXYGEN SATURATION: 100 % | RESPIRATION RATE: 18 BRPM | HEIGHT: 66 IN | TEMPERATURE: 98 F | WEIGHT: 199 LBS | HEART RATE: 84 BPM | SYSTOLIC BLOOD PRESSURE: 137 MMHG | BODY MASS INDEX: 31.98 KG/M2 | DIASTOLIC BLOOD PRESSURE: 99 MMHG

## 2019-02-14 DIAGNOSIS — T81.49XA WOUND INFECTION AFTER SURGERY: Primary | ICD-10-CM

## 2019-02-14 DIAGNOSIS — L73.2 HIDRADENITIS SUPPURATIVA OF LEFT AXILLA: ICD-10-CM

## 2019-02-14 PROCEDURE — 87186 SC STD MICRODIL/AGAR DIL: CPT

## 2019-02-14 PROCEDURE — 99203 OFFICE O/P NEW LOW 30 MIN: CPT | Mod: S$GLB,,, | Performed by: NURSE PRACTITIONER

## 2019-02-14 PROCEDURE — 99203 PR OFFICE/OUTPT VISIT, NEW, LEVL III, 30-44 MIN: ICD-10-PCS | Mod: S$GLB,,, | Performed by: NURSE PRACTITIONER

## 2019-02-14 PROCEDURE — 87070 CULTURE OTHR SPECIMN AEROBIC: CPT

## 2019-02-14 PROCEDURE — 3008F BODY MASS INDEX DOCD: CPT | Mod: CPTII,S$GLB,, | Performed by: NURSE PRACTITIONER

## 2019-02-14 PROCEDURE — 3008F PR BODY MASS INDEX (BMI) DOCUMENTED: ICD-10-PCS | Mod: CPTII,S$GLB,, | Performed by: NURSE PRACTITIONER

## 2019-02-14 PROCEDURE — 87077 CULTURE AEROBIC IDENTIFY: CPT

## 2019-02-14 RX ORDER — HYDROCHLOROTHIAZIDE 12.5 MG/1
12.5 TABLET ORAL DAILY
COMMUNITY

## 2019-02-14 RX ORDER — DOXYCYCLINE 100 MG/1
100 CAPSULE ORAL 2 TIMES DAILY
Qty: 20 CAPSULE | Refills: 0 | Status: SHIPPED | OUTPATIENT
Start: 2019-02-14 | End: 2019-02-20

## 2019-02-14 RX ORDER — PHENTERMINE HYDROCHLORIDE 37.5 MG/1
37.5 CAPSULE ORAL EVERY MORNING
COMMUNITY

## 2019-02-14 RX ORDER — NIFEDIPINE 30 MG/1
30 TABLET, FILM COATED, EXTENDED RELEASE ORAL DAILY
COMMUNITY

## 2019-02-14 NOTE — PROGRESS NOTES
"Subjective:       Patient ID: Vika Donis is a 38 y.o. female.    Vitals:  height is 5' 6" (1.676 m) and weight is 90.3 kg (199 lb). Her oral temperature is 98.4 °F (36.9 °C). Her blood pressure is 137/99 (abnormal) and her pulse is 84. Her respiration is 18 and oxygen saturation is 100%.     Chief Complaint: Wound Infection    Pt states she hidradenitis suppurativa and she had surgery under her left arm on January 28 th per Dr. Dobbins. She reports taking Bactrim 1-2 days prior to the surgery. She reports that her incision got infected on February 2nd. Pt went to the doctor (Dr. Dobbins) on Feb 5th and it was infected she was prescribed z pack. She notices it was pus and it is red, paimful, leaking constantly and this morning was bleeding and has a foul odor. Pt also states pain is radiating down to her her left arm, it feels tender around the vein.    She denies any fever or chills. No body aches.       Wound Check   She was originally treated more than 14 days ago. Prior ED Treatment: bactrum and z pack. Her temperature was unmeasured prior to arrival. There has been no drainage from the wound. The redness has worsened. The swelling has worsened. The pain has worsened. There is difficulty moving the extremity or digit due to pain.       Constitution: Negative for chills, fatigue and fever.   HENT: Negative for congestion and sore throat.    Neck: Negative for painful lymph nodes.   Cardiovascular: Negative for chest pain and leg swelling.   Eyes: Negative for double vision and blurred vision.   Respiratory: Negative for cough and shortness of breath.    Gastrointestinal: Negative for nausea, vomiting and diarrhea.   Genitourinary: Negative for dysuria, frequency, urgency and history of kidney stones.   Musculoskeletal: Positive for joint swelling. Negative for joint pain, muscle cramps and muscle ache.   Skin: Positive for color change and wound. Negative for pale, rash, erythema and bruising. "   Allergic/Immunologic: Negative for seasonal allergies.   Neurological: Negative for dizziness, history of vertigo, light-headedness, passing out and headaches.   Hematologic/Lymphatic: Negative for swollen lymph nodes.   Psychiatric/Behavioral: Negative for nervous/anxious, sleep disturbance and depression. The patient is not nervous/anxious.        Objective:      Physical Exam   Constitutional: She is oriented to person, place, and time. She appears well-developed and well-nourished.  Non-toxic appearance. She does not have a sickly appearance. She does not appear ill. No distress.   NAD   Afebrile    HENT:   Head: Normocephalic and atraumatic. Head is without abrasion, without contusion and without laceration.   Right Ear: External ear normal.   Left Ear: External ear normal.   Nose: Nose normal.   Mouth/Throat: Oropharynx is clear and moist.   Eyes: Conjunctivae, EOM and lids are normal. Pupils are equal, round, and reactive to light.   Neck: Trachea normal, normal range of motion, full passive range of motion without pain and phonation normal. Neck supple.   Cardiovascular: Normal rate, regular rhythm and normal heart sounds.   Pulses:       Radial pulses are 2+ on the right side, and 2+ on the left side.   Pulmonary/Chest: Effort normal and breath sounds normal. No stridor. No respiratory distress.   Musculoskeletal: Normal range of motion.   Neurological: She is alert and oriented to person, place, and time. She has normal strength. No sensory deficit.   NV intact distally.    Skin: Skin is warm, dry and intact. Capillary refill takes less than 2 seconds. No abrasion, no bruising, no burn, no ecchymosis, no laceration, no lesion and no rash noted. No erythema.   See pictures below of incision to left axilla surgical site.   No erythema or warmth.   Small amount of drainage with foul odor. Grey in color. Wound culture obtained in clinic.   13 sutures are in place. The middle of the incision is without  sutures and is not fully approximated as patient reports that Dr. Dobbins removed those sutures at her visit on the 5th and drained pus. Mild tenderness to incision line. No erythema noted to her left arm. Outer incision is well healed and well approximated. No induration.    Psychiatric: She has a normal mood and affect. Her speech is normal and behavior is normal. Judgment and thought content normal. Cognition and memory are normal.   Nursing note and vitals reviewed.                            Assessment:       1. Wound infection after surgery    2. Hidradenitis suppurativa of left axilla        Plan:         Wound infection after surgery  -     Aerobic culture  -     doxycycline (VIBRAMYCIN) 100 MG Cap; Take 1 capsule (100 mg total) by mouth 2 (two) times daily. for 10 days  Dispense: 20 capsule; Refill: 0    Hidradenitis suppurativa of left axilla  -     Aerobic culture  -     doxycycline (VIBRAMYCIN) 100 MG Cap; Take 1 capsule (100 mg total) by mouth 2 (two) times daily. for 10 days  Dispense: 20 capsule; Refill: 0      Wound culture sent on patient.   She will call Dr. Dobbins's office tomorrow to discuss everything with him and she has an appointment to see him next Tuesday.   Discussed ER precautions at length with patient. She verbalized understanding.   She is afebrile and non toxic in clinic today.         Patient Instructions   Start Zachy tonight   Please call Dr. Dobbins's office tomorrow to discuss this with them   Wound culture sent today   If you've had labs sent out, it will generally take 4-7 days for results to return. We will call you with the results.    If s/s worsen as we discussed- fever, increased pain, increased redness, increased drainage, numbness or tingling down arm- please go to ER for further evaluation.       Please return here or go to the Emergency Department for any concerns or worsening of condition.  If you were prescribed antibiotics, please take them to completion.  If you were  prescribed a narcotic medication, do not drive or operate heavy equipment or machinery while taking these medications.  Please follow up with your primary care doctor or specialist as needed.    If you  smoke, please stop smoking.          Hidradenitis Suppurativa (Antibiotics only)  Hidradenitis suppurativa is chronic inflammation of the sweat glands. It is a severe form of acne. Firm, red, painful bumps called nodules form. They are often filled with pus. They often get larger and may break open and drain. Common affected areas are the armpits, groin, and anal area.  The nodules are not contagious. The condition is not caused by poor hygiene.  You may have been given antibiotics and anti-inflammatory medicines to help treat the flare-up. If nodules keep getting bigger, drainage may be needed. Surgically removing the glands is the most effective treatment in severe cases.  Watch for the signs of early inflammation in the future. Look for small, tender lumps. Then follow the treatment advice below.  Home care  Follow these tips when caring for yourself at home:  · If oral antibiotics were prescribed, take all of them as directed.  · Make a warm compress by running hot water over a washcloth. Apply it to the area until the compress cools off. Repeat over a 15 minute period. Apply the hot compress 3 times a day for the first 3 days. Or you can  the shower and direct the warm spray onto the area.  · Gently wash the area with antibacterial soap. Avoid scrubbing it.  · Put on an over-the-counter antibiotic ointment 3 to 4 times a day, unless another topical medicine was prescribed.  · Use over-the-counter medicine to control pain and swelling, unless another pain medicine was given. If you have kidney or liver disease or ever had a stomach ulcer or GI bleeding, talk with your healthcare provider before using this medicine.  Prevention  Try the following to help prevent your condition:  · Avoid antiperspirants and  deodorants.  · Avoid heat and sweating as much as possible.  · Avoid shaving the affected area.  · If you smoke, consider quitting.  · Lose excess weight.  · Wear loose clothing.  Follow-up care  Follow up with your healthcare provider as advised.  When to seek medical care  Call your healthcare provider right away if any of these occur:  · Fever of 100.4°F (38°C) or higher, or as directed by your healthcare provider  · Increasing pain  · Increasing redness  · Nodules keep getting bigger  Date Last Reviewed: 7/1/2016  © 9931-3368 [employer]. [address] [place] [address]. All rights reserved. This information is not intended as a substitute for professional medical care. Always follow your healthcare professional's instructions.        Wound Check, Infection  You have a wound that has become infected. The wound will not heal properly unless the infection is cleared. Infection in a wound may also spread if it is not treated. In most cases, antibiotic medicines are prescribed to treat a wound infection.   Symptoms of a wound infection include:  · Redness or swelling around the wound  · Warmth coming from the wound  · New or worsening pain  · Red streaks around the wound  · Draining pus  · Fever  Home care  Follow all directions you are given to treat the infection.  Medicines  Take all medicines as prescribed.   · If you were given antibiotics, take them until they are gone or your healthcare provider tells you to stop. It is vital to finish the antibiotics even if you feel better. If you do not finish them, the infection may come back and be harder to treat.  · If your infection is not responding to the medicines you are taking, you may be prescribed new medicines.  · Take medicine for pain as directed by your healthcare provider.  Wound care  Care for your wound as directed by your healthcare provider.  · Apply a warm compress (clean cloth soaked in hot water) to the infected area for  about 5 to 10 minutes at a time. Be very careful not to burn yourself. Test the cloth on a non-infected area to make sure it is not too hot.  · Continue to change the dressing daily. If it becomes wet, stained with wound fluid, or dirty, change it sooner. To change it:  ¨ Wash your hands with soap and water before changing the dressing.  ¨ Carefully remove the dressing and tape. If it sticks to the wound, you may need to wet it a little to remove it. (Do not do this if your healthcare provider has told you not to.)  ¨ Gently clean the wound with clean water (or saline) using gauze, a clean washcloth, or cotton swab.  ¨ Do not use soap, alcohol, peroxide or other cleansers.  ¨ If you were told to dry the wound before putting on a new dressing, gently pat. Do not rub.  ¨ Throw out the old dressing.  ¨ Wash your hands again before opening the new, clean dressing.  ¨ Wash your hands again when you are done.  Follow-up care  Follow up with your healthcare provider as advised. If a culture was done, you will be notified if your treatment needs to change. Call as directed for the results.  When to seek medical advice  Call your health care provider right away if any of these occur:  · Symptoms of infection don't start to improve within 2 days of starting antibiotics  · Symptoms of infection get worse  · New symptoms, such as red streaks around the wound  · Fever of 100.4°F (38.0°C) or higher for more than 2 days after starting the antibiotics  Date Last Reviewed: 8/10/2015  © 4932-5498 [employer]. [address] [place] [address], [place] [address]. All rights reserved. This information is not intended as a substitute for professional medical care. Always follow your healthcare professional's instructions.

## 2019-02-15 NOTE — PATIENT INSTRUCTIONS
Start Doxy tonight   Please call Dr. Dobbins's office tomorrow to discuss this with them   Wound culture sent today   If you've had labs sent out, it will generally take 4-7 days for results to return. We will call you with the results.    If s/s worsen as we discussed- fever, increased pain, increased redness, increased drainage, numbness or tingling down arm- please go to ER for further evaluation.       Please return here or go to the Emergency Department for any concerns or worsening of condition.  If you were prescribed antibiotics, please take them to completion.  If you were prescribed a narcotic medication, do not drive or operate heavy equipment or machinery while taking these medications.  Please follow up with your primary care doctor or specialist as needed.    If you  smoke, please stop smoking.          Hidradenitis Suppurativa (Antibiotics only)  Hidradenitis suppurativa is chronic inflammation of the sweat glands. It is a severe form of acne. Firm, red, painful bumps called nodules form. They are often filled with pus. They often get larger and may break open and drain. Common affected areas are the armpits, groin, and anal area.  The nodules are not contagious. The condition is not caused by poor hygiene.  You may have been given antibiotics and anti-inflammatory medicines to help treat the flare-up. If nodules keep getting bigger, drainage may be needed. Surgically removing the glands is the most effective treatment in severe cases.  Watch for the signs of early inflammation in the future. Look for small, tender lumps. Then follow the treatment advice below.  Home care  Follow these tips when caring for yourself at home:  · If oral antibiotics were prescribed, take all of them as directed.  · Make a warm compress by running hot water over a washcloth. Apply it to the area until the compress cools off. Repeat over a 15 minute period. Apply the hot compress 3 times a day for the first 3 days. Or you  can  the shower and direct the warm spray onto the area.  · Gently wash the area with antibacterial soap. Avoid scrubbing it.  · Put on an over-the-counter antibiotic ointment 3 to 4 times a day, unless another topical medicine was prescribed.  · Use over-the-counter medicine to control pain and swelling, unless another pain medicine was given. If you have kidney or liver disease or ever had a stomach ulcer or GI bleeding, talk with your healthcare provider before using this medicine.  Prevention  Try the following to help prevent your condition:  · Avoid antiperspirants and deodorants.  · Avoid heat and sweating as much as possible.  · Avoid shaving the affected area.  · If you smoke, consider quitting.  · Lose excess weight.  · Wear loose clothing.  Follow-up care  Follow up with your healthcare provider as advised.  When to seek medical care  Call your healthcare provider right away if any of these occur:  · Fever of 100.4°F (38°C) or higher, or as directed by your healthcare provider  · Increasing pain  · Increasing redness  · Nodules keep getting bigger  Date Last Reviewed: 7/1/2016  © 7052-6463 [employer]. [address] [place] [address]. All rights reserved. This information is not intended as a substitute for professional medical care. Always follow your healthcare professional's instructions.        Wound Check, Infection  You have a wound that has become infected. The wound will not heal properly unless the infection is cleared. Infection in a wound may also spread if it is not treated. In most cases, antibiotic medicines are prescribed to treat a wound infection.   Symptoms of a wound infection include:  · Redness or swelling around the wound  · Warmth coming from the wound  · New or worsening pain  · Red streaks around the wound  · Draining pus  · Fever  Home care  Follow all directions you are given to treat the infection.  Medicines  Take all medicines as  prescribed.   · If you were given antibiotics, take them until they are gone or your healthcare provider tells you to stop. It is vital to finish the antibiotics even if you feel better. If you do not finish them, the infection may come back and be harder to treat.  · If your infection is not responding to the medicines you are taking, you may be prescribed new medicines.  · Take medicine for pain as directed by your healthcare provider.  Wound care  Care for your wound as directed by your healthcare provider.  · Apply a warm compress (clean cloth soaked in hot water) to the infected area for about 5 to 10 minutes at a time. Be very careful not to burn yourself. Test the cloth on a non-infected area to make sure it is not too hot.  · Continue to change the dressing daily. If it becomes wet, stained with wound fluid, or dirty, change it sooner. To change it:  ¨ Wash your hands with soap and water before changing the dressing.  ¨ Carefully remove the dressing and tape. If it sticks to the wound, you may need to wet it a little to remove it. (Do not do this if your healthcare provider has told you not to.)  ¨ Gently clean the wound with clean water (or saline) using gauze, a clean washcloth, or cotton swab.  ¨ Do not use soap, alcohol, peroxide or other cleansers.  ¨ If you were told to dry the wound before putting on a new dressing, gently pat. Do not rub.  ¨ Throw out the old dressing.  ¨ Wash your hands again before opening the new, clean dressing.  ¨ Wash your hands again when you are done.  Follow-up care  Follow up with your healthcare provider as advised. If a culture was done, you will be notified if your treatment needs to change. Call as directed for the results.  When to seek medical advice  Call your health care provider right away if any of these occur:  · Symptoms of infection don't start to improve within 2 days of starting antibiotics  · Symptoms of infection get worse  · New symptoms, such as red  streaks around the wound  · Fever of 100.4°F (38.0°C) or higher for more than 2 days after starting the antibiotics  Date Last Reviewed: 8/10/2015  © 4712-5387 [employer]. [address] [place] [address], [place] [address]. All rights reserved. This information is not intended as a substitute for professional medical care. Always follow your healthcare professional's instructions.

## 2019-02-18 LAB — BACTERIA SPEC AEROBE CULT: NORMAL

## 2019-02-20 ENCOUNTER — TELEPHONE (OUTPATIENT)
Dept: URGENT CARE | Facility: CLINIC | Age: 39
End: 2019-02-20

## 2019-02-20 RX ORDER — SULFAMETHOXAZOLE AND TRIMETHOPRIM 800; 160 MG/1; MG/1
1 TABLET ORAL 2 TIMES DAILY
Qty: 20 TABLET | Refills: 0 | Status: SHIPPED | OUTPATIENT
Start: 2019-02-20 | End: 2019-03-02

## 2019-02-20 NOTE — TELEPHONE ENCOUNTER
Returning patient's call.   She saw her surgeon yesterday and had her sutures removed. She notified him that she is on Doxycyline.   She is concerned because she reviewed her results on [facility] and saw that the doxycycline was not listed.   Will change patient from doxycycline to Bactrim.  Patient denies fever.   Instructed patient to follow up with surgeon for further management.   She verbalized understanding. All questions were answered.     Component 6d ago   Aerobic Bacterial Culture PROTEUS MIRABILIS   Many       Resulting Agency [facility]   Susceptibility      Proteus mirabilis     CULTURE, AEROBIC  (SPECIFY SOURCE)     Amox/K Clav'ate <=8/4  Sensitive     Amp/Sulbactam <=8/4  Sensitive     Ampicillin <=8  Sensitive     Cefazolin <=8  Sensitive     Cefepime <=8  Sensitive     Ceftriaxone <=8  Sensitive     Ciprofloxacin <=1  Sensitive     Ertapenem <=2  Sensitive     Gentamicin <=4  Sensitive     Meropenem <=4  Sensitive     Piperacillin/Tazo <=16  Sensitive     Tobramycin <=4  Sensitive     Trimeth/Sulfa <=2/38  Sensitive            Linear View      Narrative   Performed by: [facility]   Left axilla, incision site.

## 2019-02-20 NOTE — TELEPHONE ENCOUNTER
----- Message from Shanell Starkey sent at 2/19/2019 11:09 AM CST -----  Contact: Patient  Patient called regarding test results, needs a new antibiotic for bacterial strand detected. Current medicine not effective.

## 2020-12-29 ENCOUNTER — HISTORICAL (OUTPATIENT)
Dept: LAB | Facility: HOSPITAL | Age: 40
End: 2020-12-29

## 2020-12-29 LAB
ABS NEUT (OLG): 1.45 X10(3)/MCL (ref 2.1–9.2)
ALBUMIN SERPL-MCNC: 4.1 GM/DL (ref 3.5–5)
ALBUMIN/GLOB SERPL: 1.2 RATIO (ref 1.1–2)
ALP SERPL-CCNC: 59 UNIT/L (ref 40–150)
ALT SERPL-CCNC: 126 UNIT/L (ref 0–55)
APPEARANCE, UA: CLEAR
AST SERPL-CCNC: 93 UNIT/L (ref 5–34)
BASOPHILS # BLD AUTO: 0.02 X10(3)/MCL (ref 0–0.2)
BASOPHILS NFR BLD AUTO: 0.6 % (ref 0–1)
BILIRUB SERPL-MCNC: 0.3 MG/DL (ref 0.2–1.2)
BILIRUB UR QL STRIP: NEGATIVE
BILIRUBIN DIRECT+TOT PNL SERPL-MCNC: 0.1 MG/DL (ref 0–0.5)
BILIRUBIN DIRECT+TOT PNL SERPL-MCNC: 0.2 MG/DL (ref 0–0.8)
BUN SERPL-MCNC: 11.7 MG/DL (ref 7–18.7)
CALCIUM SERPL-MCNC: 9.6 MG/DL (ref 8.4–10.2)
CHLORIDE SERPL-SCNC: 100 MMOL/L (ref 98–107)
CHOLEST SERPL-MCNC: 229 MG/DL
CHOLEST/HDLC SERPL: 4 {RATIO} (ref 0–5)
CO2 SERPL-SCNC: 30 MMOL/L (ref 22–29)
COLOR UR: YELLOW
CREAT SERPL-MCNC: 0.9 MG/DL (ref 0.57–1.11)
CREAT UR-MCNC: 93.6 MG/DL (ref 45–106)
EOSINOPHIL # BLD AUTO: 0.04 X10(3)/MCL (ref 0–0.9)
EOSINOPHIL NFR BLD AUTO: 1.2 % (ref 0–6.4)
ERYTHROCYTE [DISTWIDTH] IN BLOOD BY AUTOMATED COUNT: 11.9 % (ref 11.5–17)
EST. AVERAGE GLUCOSE BLD GHB EST-MCNC: 108.3 MG/DL
GLOBULIN SER-MCNC: 3.5 GM/DL (ref 2.4–3.5)
GLUCOSE (UA): NEGATIVE
GLUCOSE SERPL-MCNC: 94 MG/DL (ref 74–100)
HBA1C MFR BLD: 5.4 %
HCT VFR BLD AUTO: 41 % (ref 37–47)
HDLC SERPL-MCNC: 63 MG/DL (ref 40–60)
HGB BLD-MCNC: 13.1 GM/DL (ref 12–16)
HGB UR QL STRIP: NEGATIVE
IMM GRANULOCYTES # BLD AUTO: 0.01 10*3/UL (ref 0–0.02)
IMM GRANULOCYTES NFR BLD AUTO: 0.3 % (ref 0–0.43)
KETONES UR QL STRIP: NEGATIVE
LDLC SERPL CALC-MCNC: 151 MG/DL (ref 50–140)
LEUKOCYTE ESTERASE UR QL STRIP: NEGATIVE
LYMPHOCYTES # BLD AUTO: 1.54 X10(3)/MCL (ref 0.6–4.6)
LYMPHOCYTES NFR BLD AUTO: 44.9 % (ref 16–44)
MCH RBC QN AUTO: 28.5 PG (ref 27–31)
MCHC RBC AUTO-ENTMCNC: 32 GM/DL (ref 33–36)
MCV RBC AUTO: 89.1 FL (ref 80–94)
MICROALBUMIN UR-MCNC: 5.7 UG/ML
MICROALBUMIN/CREAT RATIO PNL UR: 6.1 MG/GM CR (ref 0–30)
MONOCYTES # BLD AUTO: 0.37 X10(3)/MCL (ref 0.1–1.3)
MONOCYTES NFR BLD AUTO: 10.8 % (ref 4–12.1)
NEUTROPHILS # BLD AUTO: 1.45 X10(3)/MCL (ref 2.1–9.2)
NEUTROPHILS NFR BLD AUTO: 42.2 % (ref 43–73)
NITRITE UR QL STRIP: NEGATIVE
NRBC BLD AUTO-RTO: 0 % (ref 0–0.2)
PH UR STRIP: 7 [PH] (ref 5–7)
PLATELET # BLD AUTO: 220 X10(3)/MCL (ref 130–400)
PMV BLD AUTO: 10.9 FL (ref 7.4–10.4)
POTASSIUM SERPL-SCNC: 4 MMOL/L (ref 3.5–5.1)
PROT SERPL-MCNC: 7.6 GM/DL (ref 6.4–8.3)
PROT UR QL STRIP: NEGATIVE
RBC # BLD AUTO: 4.6 X10(6)/MCL (ref 4.2–5.4)
SODIUM SERPL-SCNC: 136 MMOL/L (ref 136–145)
SP GR UR STRIP: 1.01 (ref 1–1.03)
TRIGL SERPL-MCNC: 75 MG/DL (ref 0–150)
TSH SERPL-ACNC: 3.41 UIU/ML (ref 0.35–4.94)
UROBILINOGEN UR STRIP-ACNC: NEGATIVE
VLDLC SERPL CALC-MCNC: 15 MG/DL
WBC # SPEC AUTO: 3.4 X10(3)/MCL (ref 4.5–11.5)

## 2021-01-05 ENCOUNTER — HISTORICAL (OUTPATIENT)
Dept: LAB | Facility: HOSPITAL | Age: 41
End: 2021-01-05

## 2021-01-05 LAB
AMPHET UR QL SCN: NEGATIVE
ANTINUCLEAR ANTIBODY SCREEN (OHS): NEGATIVE
APAP SERPL-MCNC: <17.4 UG/ML (ref 17.4–30)
BARBITURATE SCN PRESENT UR: NEGATIVE
BENZODIAZ UR QL SCN: NEGATIVE
CANNABINOIDS UR QL SCN: NEGATIVE
COCAINE UR QL SCN: NEGATIVE
DSDNA ANTIBODY (OHS): NEGATIVE
HAV IGM SERPL QL IA: NONREACTIVE
HBV CORE IGM SERPL QL IA: NONREACTIVE
HBV SURFACE AG SERPL QL IA: NONREACTIVE
HCV AB SERPL QL IA: NONREACTIVE
HEPATITIS PANEL INTERP: NORMAL
METHADONE UR QL SCN: NEGATIVE
OPIATES UR QL SCN: NEGATIVE
PCP UR QL: NEGATIVE
PH UR STRIP.AUTO: 6 [PH] (ref 5–7.5)
POC BETA-HCG (QUAL): NEGATIVE

## 2021-01-07 ENCOUNTER — HISTORICAL (OUTPATIENT)
Dept: RADIOLOGY | Facility: HOSPITAL | Age: 41
End: 2021-01-07

## 2022-04-10 ENCOUNTER — HISTORICAL (OUTPATIENT)
Dept: ADMINISTRATIVE | Facility: HOSPITAL | Age: 42
End: 2022-04-10

## 2022-04-26 VITALS
WEIGHT: 207.69 LBS | HEIGHT: 66 IN | OXYGEN SATURATION: 99 % | SYSTOLIC BLOOD PRESSURE: 121 MMHG | DIASTOLIC BLOOD PRESSURE: 80 MMHG | BODY MASS INDEX: 33.38 KG/M2

## 2022-09-21 ENCOUNTER — HISTORICAL (OUTPATIENT)
Dept: ADMINISTRATIVE | Facility: HOSPITAL | Age: 42
End: 2022-09-21